# Patient Record
Sex: FEMALE | Race: WHITE | NOT HISPANIC OR LATINO | Employment: FULL TIME | URBAN - METROPOLITAN AREA
[De-identification: names, ages, dates, MRNs, and addresses within clinical notes are randomized per-mention and may not be internally consistent; named-entity substitution may affect disease eponyms.]

---

## 2018-11-07 ENCOUNTER — OFFICE VISIT (OUTPATIENT)
Dept: URGENT CARE | Facility: CLINIC | Age: 48
End: 2018-11-07
Payer: COMMERCIAL

## 2018-11-07 VITALS
HEART RATE: 66 BPM | BODY MASS INDEX: 21.16 KG/M2 | OXYGEN SATURATION: 98 % | SYSTOLIC BLOOD PRESSURE: 92 MMHG | TEMPERATURE: 98 F | DIASTOLIC BLOOD PRESSURE: 69 MMHG | WEIGHT: 127 LBS | HEIGHT: 65 IN | RESPIRATION RATE: 18 BRPM

## 2018-11-07 DIAGNOSIS — I80.9 PHLEBITIS AFTER INFUSION, INITIAL ENCOUNTER: Primary | ICD-10-CM

## 2018-11-07 DIAGNOSIS — T80.1XXA PHLEBITIS AFTER INFUSION, INITIAL ENCOUNTER: Primary | ICD-10-CM

## 2018-11-07 PROCEDURE — 99203 OFFICE O/P NEW LOW 30 MIN: CPT | Mod: S$GLB,,, | Performed by: FAMILY MEDICINE

## 2018-11-07 NOTE — PROGRESS NOTES
"Subjective:       Patient ID: Pat Freed is a 48 y.o. female.    Vitals:  height is 5' 5" (1.651 m) and weight is 57.6 kg (127 lb). Her tympanic temperature is 98.1 °F (36.7 °C). Her blood pressure is 92/69 and her pulse is 66. Her respiration is 18 and oxygen saturation is 98%.     Chief Complaint: Arm Pain    Patient had blood work done by a home health nurse on 2nov2018 and her arm is bruised and hurts.      Arm Pain    The incident occurred more than 1 week ago. The incident occurred at home. Injury mechanism: blood draw at home. The pain is present in the right forearm. The quality of the pain is described as aching and shooting. The pain radiates to the right neck (pain radiates up right arm to shoulder). The pain is moderate. Pertinent negatives include no chest pain. The symptoms are aggravated by movement. She has tried nothing for the symptoms.    patient denies any upper arm swelling. No fever no chills no cough, hemoptysis, or dyspnea.  She is not on any anticoagulants.  Review of Systems   Constitution: Negative for chills and fever.   HENT: Negative for sore throat.    Eyes: Negative for blurred vision.   Cardiovascular: Negative for chest pain.   Respiratory: Negative for shortness of breath.    Skin: Negative for rash.   Musculoskeletal: Positive for joint pain (right arm pain). Negative for back pain.   Gastrointestinal: Negative for abdominal pain, diarrhea, nausea and vomiting.   Neurological: Negative for headaches.   Psychiatric/Behavioral: The patient is not nervous/anxious.        Objective:      Physical Exam   Constitutional: She appears well-developed and well-nourished.   HENT:   Head: Normocephalic and atraumatic.   Mouth/Throat: Oropharynx is clear and moist.   Neck: Normal range of motion. Neck supple.   Cardiovascular: Normal rate and regular rhythm.   Pulmonary/Chest: Effort normal and breath sounds normal.   Musculoskeletal:   Subcutaneous ecchymoses and hematoma noted along " the right brachioradialis muscle just distal to recent phlebotomy needlestick site.  Mildly tender but no secondary infection.  Arterial circulation and capillary refill normal bilaterally.  No swelling or discomfort to palpation proximal to the phlebotomy needlestick site at the antecubital fossa.   Nursing note and vitals reviewed.      Assessment:       1. Phlebitis after infusion, initial encounter        Plan:         Phlebitis after infusion, initial encounter     patient's symptoms in physical findings indicate a subcutaneous hematoma associated with recent phlebotomy.  However I do not see any evidence of upper extremity DVT or secondary infection.  Local care including moist heat elevation muscle size should help to resolve the symptoms.  Patient is advised to be re-evaluated if she develops proximal arm swelling or increasing proximal arm pain.

## 2018-11-07 NOTE — PATIENT INSTRUCTIONS
Superficial Thrombophlebitis   The superficial veins are the veins near the surface of the skin. Superficial thrombophlebitis is a problem that occurs when one or more of these veins become inflamed (red, irritated, and swollen). This is most often because of a blood clot.  Causes  The problem may occur after injury to a vein. It may also occur after having an intravenous (IV) line placed. Other factors that can make the problem more likely include:  · Varicose veins  · Venous insufficiency  · Bleeding disorders  · Prolonged periods of rest and not moving around  · IV drug abuse  Symptoms  Symptoms may appear in the affected area. They can include:  · Pain  · Tenderness  · Redness  · Warmth  · Swelling  · Hardening of the vein  In most cases, superficial thrombophlebitis resolves on its own with no problems. Treatment is focused on relieving symptoms.  Sometimes, there is a risk that the deep veins in the body may also be involved. This can lead to more serious problems. In such cases, further testing and treatments may be needed. Your healthcare provider can tell you more about this.  Home Care  To help relieve pain and swelling, you may be told to:  · Apply heat or cold to the affected area. Do this for up to 10 minutes as often as directed.  ¨ Heat: Use a warm compress, such as a heating pad.  ¨ Cold: Use a cold compress, such as a cold pack or bag of ice wrapped in a thin towel.  · Take nonsteroidal anti-inflammatory drugs (NSAIDS), such as ibuprofen. In some cases, other pain medicines may be prescribed.  · Keep the affected limb (arm or leg) raised above heart level as directed.  · Wear elastic compression stockings or bandages as directed.  · Avoid prolonged sitting or standing. Get up and walk often.  To help treat a blood clot, a blood thinner (anticoagulant) may be prescribed. If this is needed, be sure to take the medicine exactly as directed.  Follow-up care  Follow up with your healthcare provider as  advised. If imaging tests are done, they will be reviewed by a doctor. Youll be told the results and any new findings that may affect your treatment.  When to seek medical advice  Call your healthcare provider right away if any of these occur:  · Fever of 100.4°F (38ºC) or higher, or as directed by your provider  · Increasing pain, swelling, or tenderness in the affected area  · Spreading warmth or redness in the affected area  Call 911  Call 911 right away if any of these occur:  · Trouble breathing  · Chest pain or discomfort that worsens with deep breathing or coughing  · Coughing (may cough up blood)  · Fast or irregular heartbeat  · Sweating  · Anxiety  · Lightheadedness, dizziness, or fainting  · Extreme confusion  · Extreme drowsiness or trouble waking up  · New pain in the chest, arm, shoulder, neck, or upper back  Date Last Reviewed: 9/21/2015  © 8898-5953 [employer]. [address] [place] [address], [place] [address]. All rights reserved. This information is not intended as a substitute for professional medical care. Always follow your healthcare professional's instructions.

## 2018-11-10 ENCOUNTER — TELEPHONE (OUTPATIENT)
Dept: URGENT CARE | Facility: CLINIC | Age: 48
End: 2018-11-10

## 2018-11-10 NOTE — TELEPHONE ENCOUNTER
I attempted to check on the patient since her visit, but there was no answer and no way to leave a message.

## 2018-11-11 ENCOUNTER — OFFICE VISIT (OUTPATIENT)
Dept: URGENT CARE | Facility: CLINIC | Age: 48
End: 2018-11-11
Payer: COMMERCIAL

## 2018-11-11 VITALS
WEIGHT: 127 LBS | RESPIRATION RATE: 15 BRPM | OXYGEN SATURATION: 96 % | HEART RATE: 80 BPM | DIASTOLIC BLOOD PRESSURE: 75 MMHG | TEMPERATURE: 98 F | BODY MASS INDEX: 21.16 KG/M2 | HEIGHT: 65 IN | SYSTOLIC BLOOD PRESSURE: 102 MMHG

## 2018-11-11 DIAGNOSIS — I80.8: Primary | ICD-10-CM

## 2018-11-11 PROCEDURE — 99214 OFFICE O/P EST MOD 30 MIN: CPT | Mod: S$GLB,,, | Performed by: FAMILY MEDICINE

## 2018-11-11 NOTE — PROGRESS NOTES
"Subjective:       Patient ID: Pat Freed is a 48 y.o. female.    Vitals:  height is 5' 5" (1.651 m) and weight is 57.6 kg (127 lb). Her oral temperature is 98.4 °F (36.9 °C). Her blood pressure is 102/75 and her pulse is 80. Her respiration is 15 and oxygen saturation is 96%.     Chief Complaint: Arm Pain    Patient returns today for increased bruising, pain, tingling, swelling in her right arm. She has applied warm compress and elevation but is not getting any relief.      Arm Pain    The incident occurred more than 1 week ago. The incident occurred at home. Injury mechanism: Blood draw by home health nurse. The pain is present in the right shoulder, right hand, right forearm, right fingers, right elbow, right wrist and upper right arm. The quality of the pain is described as aching, burning and shooting. The pain is moderate. The pain has been constant since the incident. Associated symptoms include tingling. Pertinent negatives include no chest pain. The symptoms are aggravated by movement. She has tried heat, elevation and immobilization for the symptoms. The treatment provided no relief.     Review of Systems   Constitution: Negative for chills and fever.   HENT: Negative for sore throat.    Eyes: Negative for blurred vision.   Cardiovascular: Negative for chest pain.   Respiratory: Negative for shortness of breath.    Skin: Negative for rash.   Musculoskeletal: Positive for joint pain. Negative for back pain.   Gastrointestinal: Negative for abdominal pain, diarrhea, nausea and vomiting.   Neurological: Positive for tingling. Negative for headaches.   Psychiatric/Behavioral: The patient is not nervous/anxious.        Objective:      Physical Exam   Constitutional: She is oriented to person, place, and time. She appears well-developed and well-nourished.   HENT:   Head: Normocephalic and atraumatic. Head is without abrasion, without contusion and without laceration.   Nose: Nose normal.   Eyes: " Conjunctivae, EOM and lids are normal.   Neck: Trachea normal, full passive range of motion without pain and phonation normal. Neck supple.   Cardiovascular: Normal rate.   Pulmonary/Chest: Effort normal. No stridor. No respiratory distress.   Musculoskeletal: She exhibits edema and tenderness.   Neurological: She is alert and oriented to person, place, and time.   Skin: Skin is warm, dry and intact. Capillary refill takes less than 2 seconds. Bruising and ecchymosis noted. No abrasion, no burn, no laceration, no lesion and no rash noted. No erythema.   Bruising over brachrad muscle and distal forearm   Psychiatric: She has a normal mood and affect. Her speech is normal and behavior is normal. Judgment and thought content normal. Cognition and memory are normal.   Nursing note and vitals reviewed.      Assessment:       1. Phlebitis of forearm        Plan:         Phlebitis of forearm  -     US Upper Extremity Veins Right; Future; Expected date: 11/11/2018        ER if worsens

## 2018-11-13 ENCOUNTER — HOSPITAL ENCOUNTER (OUTPATIENT)
Dept: RADIOLOGY | Facility: HOSPITAL | Age: 48
Discharge: HOME | End: 2018-11-13
Attending: FAMILY MEDICINE
Payer: COMMERCIAL

## 2018-11-13 ENCOUNTER — TELEPHONE (OUTPATIENT)
Dept: URGENT CARE | Facility: CLINIC | Age: 48
End: 2018-11-13

## 2018-11-13 DIAGNOSIS — I80.8: ICD-10-CM

## 2018-11-13 PROCEDURE — 93971 EXTREMITY STUDY: CPT | Mod: 26,,, | Performed by: RADIOLOGY

## 2018-11-13 PROCEDURE — 93971 EXTREMITY STUDY: CPT | Mod: TC,PO

## 2018-12-31 ENCOUNTER — OFFICE VISIT (OUTPATIENT)
Dept: URGENT CARE | Facility: CLINIC | Age: 48
End: 2018-12-31
Payer: COMMERCIAL

## 2018-12-31 VITALS
OXYGEN SATURATION: 97 % | BODY MASS INDEX: 21.16 KG/M2 | SYSTOLIC BLOOD PRESSURE: 98 MMHG | RESPIRATION RATE: 15 BRPM | WEIGHT: 127 LBS | HEIGHT: 65 IN | TEMPERATURE: 98 F | DIASTOLIC BLOOD PRESSURE: 62 MMHG | HEART RATE: 95 BPM

## 2018-12-31 DIAGNOSIS — J06.9 VIRAL URI WITH COUGH: Primary | ICD-10-CM

## 2018-12-31 DIAGNOSIS — F17.200 SMOKER: ICD-10-CM

## 2018-12-31 PROCEDURE — 96372 THER/PROPH/DIAG INJ SC/IM: CPT | Mod: S$GLB,,, | Performed by: PHYSICIAN ASSISTANT

## 2018-12-31 PROCEDURE — 99214 OFFICE O/P EST MOD 30 MIN: CPT | Mod: 25,S$GLB,, | Performed by: PHYSICIAN ASSISTANT

## 2018-12-31 RX ORDER — PROMETHAZINE HYDROCHLORIDE AND DEXTROMETHORPHAN HYDROBROMIDE 6.25; 15 MG/5ML; MG/5ML
5 SYRUP ORAL NIGHTLY PRN
Qty: 180 ML | Refills: 1 | Status: SHIPPED | OUTPATIENT
Start: 2018-12-31 | End: 2019-01-10

## 2018-12-31 RX ORDER — PREDNISONE 10 MG/1
TABLET ORAL
Qty: 18 TABLET | Refills: 0 | Status: SHIPPED | OUTPATIENT
Start: 2018-12-31 | End: 2021-07-13

## 2018-12-31 RX ORDER — BETAMETHASONE SODIUM PHOSPHATE AND BETAMETHASONE ACETATE 3; 3 MG/ML; MG/ML
6 INJECTION, SUSPENSION INTRA-ARTICULAR; INTRALESIONAL; INTRAMUSCULAR; SOFT TISSUE
Status: COMPLETED | OUTPATIENT
Start: 2018-12-31 | End: 2018-12-31

## 2018-12-31 RX ORDER — BENZONATATE 200 MG/1
200 CAPSULE ORAL 3 TIMES DAILY PRN
Qty: 60 CAPSULE | Refills: 1 | Status: SHIPPED | OUTPATIENT
Start: 2018-12-31 | End: 2019-01-10

## 2018-12-31 RX ADMIN — BETAMETHASONE SODIUM PHOSPHATE AND BETAMETHASONE ACETATE 6 MG: 3; 3 INJECTION, SUSPENSION INTRA-ARTICULAR; INTRALESIONAL; INTRAMUSCULAR; SOFT TISSUE at 03:12

## 2018-12-31 NOTE — PATIENT INSTRUCTIONS
Start prednisone in 2-3 days.     How to Quit Smoking  Smoking is one of the hardest habits to break. About half of all people who have ever smoked have been able to quit. Most people who still smoke want to quit. Here are some of the best ways to stop smoking.    Keep trying  Most smokers make many attempts at quitting before they are successful. Its important not to give up.  Go cold turkey  Most former smokers quit cold turkey (all at once). Trying to cut back gradually doesn't seem to work as well, perhaps because it continues the smoking habit. Also, it is possible to inhale more while smoking fewer cigarettes. This results in the same amount of nicotine in your body.  Get support  Support programs can be a big help, especially for heavy smokers. These groups offer lectures, ways to change behavior, and peer support. Here are some ways to find a support program:  · Free national quitline: 800-QUIT-NOW (874-654-5978).  · Hospital quit-smoking programs.  · American Lung Association: (758.913.8495).  · American Cancer Society (457-054-4438).  Support at home is important too. Nonsmokers can offer praise and encouragement. If the smoker in your life finds it hard to quit, encourage them to keep trying.  Over-the-counter medicines  Nicotine replacement therapy may make quitting easier. Certain aids, such as the nicotine patch, gum, and lozenges, are available without a prescription. It is best to use these under a doctors care, though. The skin patch provides a steady supply of nicotine. Nicotine gum and lozenges give temporary bursts of low levels of nicotine. Both methods reduce the craving for cigarettes. Warning: If you have nausea, vomiting, dizziness, weakness, or a fast heartbeat, stop using these products and see your doctor.  Prescription medicines  After reviewing your smoking patterns and past attempts to quit, your doctor may offer a prescription medicine such as bupropion, varenicline, a nicotine  "inhaler, or nasal spray. Each has advantages and side effects. Your doctor can review these with you.  Health benefits of quitting  The benefits of quitting start right away and keep improving the longer you go without smoking. These benefits occur at any age.  So whether you are 17 or 70, quitting is a good decision. Some of the benefits include:  · 20 minutes: Blood pressure and pulse return to normal.  · 8 hours: Oxygen levels return to normal.  · 2 days: Ability to smell and taste begin to improve as damaged nerves regrow.  · 2 to 3 weeks: Circulation and lung function improve.  · 1 to 9 months: Coughing, congestion, and shortness of breath decrease; tiredness decreases.  · 1 year: Risk of heart attack decreases by half.  · 5 years: Risk of lung cancer decreases by half; risk of stroke becomes the same as a nonsmokers.  For more on how to quit smoking, try these online resources:   · Enlightened Lifestyle.gov  · "Clearing the Air" booklet from the National Cancer [place]: mAPPn.gov/sites/default/files/pdf/clearing-the-air-accessible.pdf  Date Last Reviewed: 3/1/2017  © 3490-0195 [employer]. [address] [place] [address]. All rights reserved. This information is not intended as a substitute for professional medical care. Always follow your healthcare professional's instructions.          Viral Upper Respiratory Illness (Adult)  You have a viral upper respiratory illness (URI), which is another term for the common cold. This illness is contagious during the first few days. It is spread through the air by coughing and sneezing. It may also be spread by direct contact (touching the sick person and then touching your own eyes, nose, or mouth). Frequent handwashing will decrease risk of spread. Most viral illnesses go away within 7 to 10 days with rest and simple home remedies. Sometimes the illness may last for several weeks. Antibiotics will not kill a virus, and they are generally not " prescribed for this condition.    Home care  · If symptoms are severe, rest at home for the first 2 to 3 days. When you resume activity, don't let yourself get too tired.  · Avoid being exposed to cigarette smoke (yours or others).  · You may use acetaminophen or ibuprofen to control pain and fever, unless another medicine was prescribed. (Note: If you have chronic liver or kidney disease, have ever had a stomach ulcer or gastrointestinal bleeding, or are taking blood-thinning medicines, talk with your healthcare provider before using these medicines.) Aspirin should never be given to anyone under 18 years of age who is ill with a viral infection or fever. It may cause severe liver or brain damage.  · Your appetite may be poor, so a light diet is fine. Avoid dehydration by drinking 6 to 8 glasses of fluids per day (water, soft drinks, juices, tea, or soup). Extra fluids will help loosen secretions in the nose and lungs.  · Over-the-counter cold medicines will not shorten the length of time youre sick, but they may be helpful for the following symptoms: cough, sore throat, and nasal and sinus congestion. (Note: Do not use decongestants if you have high blood pressure.)  Follow-up care  Follow up with your healthcare provider, or as advised.  When to seek medical advice  Call your healthcare provider right away if any of these occur:  · Cough with lots of colored sputum (mucus)  · Severe headache; face, neck, or ear pain  · Difficulty swallowing due to throat pain  · Fever of 100.4°F (38°C)  Call 911, or get immediate medical care  Call emergency services right away if any of these occur:  · Chest pain, shortness of breath, wheezing, or difficulty breathing  · Coughing up blood  · Inability to swallow due to throat pain  Date Last Reviewed: 9/13/2015  © 8490-8708 [employer]. [address] [place] [address], [place] [address]. All rights reserved. This information is not intended as a substitute for  professional medical care. Always follow your healthcare professional's instructions.    You received a steroid shot today - this can elevate your blood pressure, elevate your blood sugar, water weight gain, nervous energy, redness to the face and dimpling of the skin where the shot goes in.     If you were prescribed a narcotic medication, do not drive or operate heavy equipment or machinery while taking these medications.  You must understand that you've received an Urgent Care treatment only and that you may be released before all your medical problems are known or treated. You, the patient, will arrange for follow up care as instructed.  Follow up with your PCP or specialty clinic as directed in the next 1-2 weeks if not improved or as needed.  You can call [phone] to schedule an appointment with the appropriate provider.  If your condition worsens we recommend that you receive another evaluation at the emergency room immediately or contact your primary medical clinics after hours call service to discuss your concerns.  Please return here or go to the Emergency Department for any concerns or worsening of condition.

## 2018-12-31 NOTE — PROGRESS NOTES
"Subjective:       Patient ID: Pat Freed is a 48 y.o. female.    Vitals:  height is 5' 5" (1.651 m) and weight is 57.6 kg (127 lb). Her temperature is 97.7 °F (36.5 °C). Her blood pressure is 98/62 and her pulse is 95. Her respiration is 15 and oxygen saturation is 97%.     Chief Complaint: URI (pt has been sick for about a week)    Pt said she has been taking robitussin       URI    This is a new problem. The current episode started in the past 7 days. The problem has been unchanged. There has been no fever. Associated symptoms include congestion, coughing and wheezing. Pertinent negatives include no ear pain, nausea, rash, sinus pain, sore throat or vomiting. She has tried NSAIDs and decongestant for the symptoms. The treatment provided mild relief.       Constitution: Positive for appetite change and fatigue. Negative for chills, sweating and fever.   HENT: Positive for congestion. Negative for ear pain, sinus pain, sinus pressure, sore throat and voice change.    Neck: Negative for painful lymph nodes.   Eyes: Negative for eye redness.   Respiratory: Positive for cough and wheezing. Negative for chest tightness, sputum production, bloody sputum, COPD, shortness of breath, stridor and asthma.    Gastrointestinal: Negative for nausea and vomiting.   Musculoskeletal: Negative for muscle ache.   Skin: Negative for rash.   Allergic/Immunologic: Negative for seasonal allergies and asthma.   Hematologic/Lymphatic: Negative for swollen lymph nodes.       Objective:      Physical Exam   Constitutional: She is oriented to person, place, and time. She appears well-developed and well-nourished. She is cooperative.  Non-toxic appearance. She appears ill. No distress.   HENT:   Head: Normocephalic and atraumatic.   Right Ear: Hearing, tympanic membrane, external ear and ear canal normal.   Left Ear: Hearing, tympanic membrane, external ear and ear canal normal.   Nose: Nose normal. No mucosal edema, rhinorrhea or nasal " deformity. No epistaxis. Right sinus exhibits no maxillary sinus tenderness and no frontal sinus tenderness. Left sinus exhibits no maxillary sinus tenderness and no frontal sinus tenderness.   Mouth/Throat: Uvula is midline, oropharynx is clear and moist and mucous membranes are normal. No trismus in the jaw. Normal dentition. No uvula swelling. No posterior oropharyngeal erythema.   Eyes: Conjunctivae and lids are normal. No scleral icterus.   Sclera clear bilat   Neck: Trachea normal, full passive range of motion without pain and phonation normal. Neck supple.   Cardiovascular: Normal rate, regular rhythm, normal heart sounds, intact distal pulses and normal pulses.   Pulmonary/Chest: Effort normal and breath sounds normal. No respiratory distress.   Abdominal: Soft. Normal appearance and bowel sounds are normal. She exhibits no distension. There is no tenderness.   Musculoskeletal: Normal range of motion. She exhibits no edema or deformity.   Neurological: She is alert and oriented to person, place, and time. She exhibits normal muscle tone. Coordination normal.   Skin: Skin is warm, dry and intact. She is not diaphoretic. No pallor.   Psychiatric: She has a normal mood and affect. Her speech is normal and behavior is normal. Judgment and thought content normal. Cognition and memory are normal.   Nursing note and vitals reviewed.      Assessment:       1. Viral URI with cough        Plan:         Viral URI with cough  -     betamethasone acetate-betamethasone sodium phosphate injection 6 mg  -     benzonatate (TESSALON) 200 MG capsule; Take 1 capsule (200 mg total) by mouth 3 (three) times daily as needed for Cough.  Dispense: 60 capsule; Refill: 1  -     promethazine-dextromethorphan (PROMETHAZINE-DM) 6.25-15 mg/5 mL Syrp; Take 5 mLs by mouth nightly as needed.  Dispense: 180 mL; Refill: 1  -     predniSONE (DELTASONE) 10 MG tablet; Take two pills po x 5 days, 1 pill po x 5 days, 1/2 pill po until empty. Start  24 hours after injection.  Dispense: 18 tablet; Refill: 0      Patient Instructions   Start prednisone in 2-3 days.     How to Quit Smoking  Smoking is one of the hardest habits to break. About half of all people who have ever smoked have been able to quit. Most people who still smoke want to quit. Here are some of the best ways to stop smoking.    Keep trying  Most smokers make many attempts at quitting before they are successful. Its important not to give up.  Go cold turkey  Most former smokers quit cold turkey (all at once). Trying to cut back gradually doesn't seem to work as well, perhaps because it continues the smoking habit. Also, it is possible to inhale more while smoking fewer cigarettes. This results in the same amount of nicotine in your body.  Get support  Support programs can be a big help, especially for heavy smokers. These groups offer lectures, ways to change behavior, and peer support. Here are some ways to find a support program:  · Free national quitline: 800-QUIT-NOW (691-202-0029).  · Hospital quit-smoking programs.  · American Lung Association: (920.722.2904).  · American Cancer Society (034-380-5691).  Support at home is important too. Nonsmokers can offer praise and encouragement. If the smoker in your life finds it hard to quit, encourage them to keep trying.  Over-the-counter medicines  Nicotine replacement therapy may make quitting easier. Certain aids, such as the nicotine patch, gum, and lozenges, are available without a prescription. It is best to use these under a doctors care, though. The skin patch provides a steady supply of nicotine. Nicotine gum and lozenges give temporary bursts of low levels of nicotine. Both methods reduce the craving for cigarettes. Warning: If you have nausea, vomiting, dizziness, weakness, or a fast heartbeat, stop using these products and see your doctor.  Prescription medicines  After reviewing your smoking patterns and past attempts to quit, your  "doctor may offer a prescription medicine such as bupropion, varenicline, a nicotine inhaler, or nasal spray. Each has advantages and side effects. Your doctor can review these with you.  Health benefits of quitting  The benefits of quitting start right away and keep improving the longer you go without smoking. These benefits occur at any age.  So whether you are 17 or 70, quitting is a good decision. Some of the benefits include:  · 20 minutes: Blood pressure and pulse return to normal.  · 8 hours: Oxygen levels return to normal.  · 2 days: Ability to smell and taste begin to improve as damaged nerves regrow.  · 2 to 3 weeks: Circulation and lung function improve.  · 1 to 9 months: Coughing, congestion, and shortness of breath decrease; tiredness decreases.  · 1 year: Risk of heart attack decreases by half.  · 5 years: Risk of lung cancer decreases by half; risk of stroke becomes the same as a nonsmokers.  For more on how to quit smoking, try these online resources:   · [employer].gov  · "Clearing the Air" booklet from the National Cancer [place]: [employer]gov/sites/default/files/pdf/clearing-the-air-accessible.pdf  Date Last Reviewed: 3/1/2017  © 5703-6239 [employer]. [address] [place] [address], [place] [address]. All rights reserved. This information is not intended as a substitute for professional medical care. Always follow your healthcare professional's instructions.          Viral Upper Respiratory Illness (Adult)  You have a viral upper respiratory illness (URI), which is another term for the common cold. This illness is contagious during the first few days. It is spread through the air by coughing and sneezing. It may also be spread by direct contact (touching the sick person and then touching your own eyes, nose, or mouth). Frequent handwashing will decrease risk of spread. Most viral illnesses go away within 7 to 10 days with rest and simple home remedies. Sometimes the illness may last for " several weeks. Antibiotics will not kill a virus, and they are generally not prescribed for this condition.    Home care  · If symptoms are severe, rest at home for the first 2 to 3 days. When you resume activity, don't let yourself get too tired.  · Avoid being exposed to cigarette smoke (yours or others).  · You may use acetaminophen or ibuprofen to control pain and fever, unless another medicine was prescribed. (Note: If you have chronic liver or kidney disease, have ever had a stomach ulcer or gastrointestinal bleeding, or are taking blood-thinning medicines, talk with your healthcare provider before using these medicines.) Aspirin should never be given to anyone under 18 years of age who is ill with a viral infection or fever. It may cause severe liver or brain damage.  · Your appetite may be poor, so a light diet is fine. Avoid dehydration by drinking 6 to 8 glasses of fluids per day (water, soft drinks, juices, tea, or soup). Extra fluids will help loosen secretions in the nose and lungs.  · Over-the-counter cold medicines will not shorten the length of time youre sick, but they may be helpful for the following symptoms: cough, sore throat, and nasal and sinus congestion. (Note: Do not use decongestants if you have high blood pressure.)  Follow-up care  Follow up with your healthcare provider, or as advised.  When to seek medical advice  Call your healthcare provider right away if any of these occur:  · Cough with lots of colored sputum (mucus)  · Severe headache; face, neck, or ear pain  · Difficulty swallowing due to throat pain  · Fever of 100.4°F (38°C)  Call 911, or get immediate medical care  Call emergency services right away if any of these occur:  · Chest pain, shortness of breath, wheezing, or difficulty breathing  · Coughing up blood  · Inability to swallow due to throat pain  Date Last Reviewed: 9/13/2015  © 4392-7316 [employer]. [address] [place] [address], [place] [address]. All  rights reserved. This information is not intended as a substitute for professional medical care. Always follow your healthcare professional's instructions.    You received a steroid shot today - this can elevate your blood pressure, elevate your blood sugar, water weight gain, nervous energy, redness to the face and dimpling of the skin where the shot goes in.     If you were prescribed a narcotic medication, do not drive or operate heavy equipment or machinery while taking these medications.  You must understand that you've received an Urgent Care treatment only and that you may be released before all your medical problems are known or treated. You, the patient, will arrange for follow up care as instructed.  Follow up with your PCP or specialty clinic as directed in the next 1-2 weeks if not improved or as needed.  You can call [phone] to schedule an appointment with the appropriate provider.  If your condition worsens we recommend that you receive another evaluation at the emergency room immediately or contact your primary medical clinics after hours call service to discuss your concerns.  Please return here or go to the Emergency Department for any concerns or worsening of condition.

## 2019-01-03 ENCOUNTER — TELEPHONE (OUTPATIENT)
Dept: URGENT CARE | Facility: CLINIC | Age: 49
End: 2019-01-03

## 2019-04-15 ENCOUNTER — OFFICE VISIT (OUTPATIENT)
Dept: URGENT CARE | Facility: CLINIC | Age: 49
End: 2019-04-15
Payer: COMMERCIAL

## 2019-04-15 VITALS
WEIGHT: 127 LBS | HEIGHT: 65 IN | HEART RATE: 80 BPM | TEMPERATURE: 98 F | BODY MASS INDEX: 21.16 KG/M2 | OXYGEN SATURATION: 98 %

## 2019-04-15 DIAGNOSIS — J01.00 ACUTE MAXILLARY SINUSITIS, RECURRENCE NOT SPECIFIED: ICD-10-CM

## 2019-04-15 DIAGNOSIS — R42 VERTIGO: Primary | ICD-10-CM

## 2019-04-15 PROCEDURE — 99213 PR OFFICE/OUTPT VISIT, EST, LEVL III, 20-29 MIN: ICD-10-PCS | Mod: S$GLB,,, | Performed by: FAMILY MEDICINE

## 2019-04-15 PROCEDURE — 99213 OFFICE O/P EST LOW 20 MIN: CPT | Mod: S$GLB,,, | Performed by: FAMILY MEDICINE

## 2019-04-15 RX ORDER — AMOXICILLIN 875 MG/1
875 TABLET, FILM COATED ORAL 2 TIMES DAILY
Qty: 20 TABLET | Refills: 0 | Status: SHIPPED | OUTPATIENT
Start: 2019-04-15 | End: 2019-04-25

## 2019-04-15 NOTE — PATIENT INSTRUCTIONS
Acute Sinusitis    Acute sinusitis is irritation and swelling of the sinuses. It is usually caused by a viral infection after a common cold. Your doctor can help you find relief.  What is acute sinusitis?  Sinuses are air-filled spaces in the skull behind the face. They are kept moist and clean by a lining of mucosa. Things such as pollen, smoke, and chemical fumes can irritate the mucosa. It can then swell up. As a response to irritation, the mucosa makes more mucus and other fluids. Tiny hairlike cilia cover the mucosa. Cilia help carry mucus toward the opening of the sinus. Too much mucus may cause the cilia to stop working. This blocks the sinus opening. A buildup of fluid in the sinuses then causes pain and pressure. It can also encourage bacteria to grow in the sinuses.  Common symptoms of acute sinusitis  You may have:  · Facial soreness pain  · Headache  · Fever  · Fluid draining in the back of the throat (postnasal drip)  · Congestion  · Drainage that is thick and colored, instead of clear  · Cough  Diagnosing acute sinusitis  Your doctor will ask about your symptoms and health history. He or she will look at your ear, nose, and throat. You usually won't need to have X-rays taken.    The doctor may take a sample of mucus to check for bacteria. If you have sinusitis that keeps coming back, you may need imaging tests such as X-rays or CAT scans. This will help your doctor check for a structural problem that may be causing the infection.  Treating acute sinusitis  Treatment is aimed at unblocking the sinus opening and helping the cilia work again. You may need to take antihistamine and decongestant medicine. These can reduce inflammation and decrease the amount of fluid your sinuses make. If you have a bacterial infection, you will need to take antibiotic medicine for 10 to 14 days. Take this medicine until it is gone, even if you feel better.  Date Last Reviewed: 10/1/2016  © 2682-0719 [employer] [address] [place] [address]. All rights reserved. This information is not intended as a substitute for professional medical care. Always follow your healthcare professional's instructions.

## 2019-04-15 NOTE — PROGRESS NOTES
"Subjective:       Patient ID: Pat Freed is a 48 y.o. female.    Vitals:  height is 5' 5" (1.651 m) and weight is 57.6 kg (127 lb). Her oral temperature is 97.6 °F (36.4 °C). Her pulse is 80. Her oxygen saturation is 98%.     Chief Complaint: Dizziness    Pt c/o cough, light-headed, dizziness, congestion, and headache, x     Dizziness:   Chronicity:  New  Onset:  Yesterday  Progression since onset:  Unchanged  Pain Scale:  1/10  Severity:  Mild   Associated symptoms: headaches.no ear pain, no fever, no nausea, no vomiting and no diaphoresis.  Aggravated by:  Getting up, bending and position changes  Treatments tried: flonase.  URI    This is a new problem. The current episode started yesterday. The problem has been gradually worsening. There has been no fever. Associated symptoms include congestion, coughing, headaches and rhinorrhea. Pertinent negatives include no ear pain, nausea, rash, sinus pain, sore throat, vomiting or wheezing. Treatments tried: flonase        Constitution: Negative for chills, sweating, fatigue and fever.   HENT: Positive for congestion and sinus pressure. Negative for ear pain, sinus pain, sore throat and voice change.    Neck: Negative for painful lymph nodes.   Eyes: Negative for eye redness.   Respiratory: Positive for cough. Negative for chest tightness, sputum production, bloody sputum, COPD, shortness of breath, stridor, wheezing and asthma.    Gastrointestinal: Negative for nausea and vomiting.   Musculoskeletal: Negative for muscle ache.   Skin: Negative for rash.   Allergic/Immunologic: Negative for seasonal allergies and asthma.   Neurological: Positive for dizziness, loss of balance, headaches and disorientation.   Hematologic/Lymphatic: Negative for swollen lymph nodes.   Psychiatric/Behavioral: Positive for disorientation.       Objective:      Physical Exam   Constitutional: She is oriented to person, place, and time. She appears well-developed and well-nourished. She " is cooperative.  Non-toxic appearance. She does not appear ill. No distress.   HENT:   Head: Normocephalic and atraumatic.   Right Ear: Hearing, tympanic membrane, external ear and ear canal normal.   Left Ear: Hearing, tympanic membrane, external ear and ear canal normal.   Nose: Nose normal. No mucosal edema, rhinorrhea or nasal deformity. No epistaxis. Right sinus exhibits no maxillary sinus tenderness and no frontal sinus tenderness. Left sinus exhibits no maxillary sinus tenderness and no frontal sinus tenderness.   Mouth/Throat: Uvula is midline, oropharynx is clear and moist and mucous membranes are normal. No trismus in the jaw. Normal dentition. No uvula swelling. No posterior oropharyngeal erythema.   Eyes: Conjunctivae and lids are normal. No scleral icterus.   Sclera clear bilat   Neck: Trachea normal, full passive range of motion without pain and phonation normal. Neck supple.   Cardiovascular: Normal rate, regular rhythm, normal heart sounds, intact distal pulses and normal pulses.   Pulmonary/Chest: Effort normal and breath sounds normal. No respiratory distress.   Abdominal: Soft. Normal appearance and bowel sounds are normal. She exhibits no distension. There is no tenderness.   Musculoskeletal: Normal range of motion. She exhibits no edema or deformity.   Neurological: She is alert and oriented to person, place, and time. No cranial nerve deficit or sensory deficit. She exhibits normal muscle tone.   Normal cerebellar functions   Skin: Skin is warm, dry and intact. She is not diaphoretic. No pallor.   Psychiatric: She has a normal mood and affect. Her speech is normal and behavior is normal. Judgment and thought content normal. Cognition and memory are normal.   Nursing note and vitals reviewed.      Assessment:       1. Vertigo    2. Acute maxillary sinusitis, recurrence not specified        Plan:         Vertigo    Acute maxillary sinusitis, recurrence not specified    Other orders  -      amoxicillin (AMOXIL) 875 MG tablet; Take 1 tablet (875 mg total) by mouth 2 (two) times daily. for 10 days  Dispense: 20 tablet; Refill: 0

## 2021-04-17 ENCOUNTER — OFFICE VISIT (OUTPATIENT)
Dept: URGENT CARE | Facility: CLINIC | Age: 51
End: 2021-04-17
Payer: COMMERCIAL

## 2021-04-17 VITALS
HEART RATE: 93 BPM | HEIGHT: 65 IN | TEMPERATURE: 98 F | OXYGEN SATURATION: 97 % | WEIGHT: 125 LBS | DIASTOLIC BLOOD PRESSURE: 82 MMHG | BODY MASS INDEX: 20.83 KG/M2 | SYSTOLIC BLOOD PRESSURE: 128 MMHG

## 2021-04-17 DIAGNOSIS — Z76.89 ENCOUNTER TO ESTABLISH CARE WITH NEW DOCTOR: ICD-10-CM

## 2021-04-17 DIAGNOSIS — M54.50 ACUTE LOW BACK PAIN WITHOUT SCIATICA, UNSPECIFIED BACK PAIN LATERALITY: Primary | ICD-10-CM

## 2021-04-17 DIAGNOSIS — M54.9 DORSALGIA, UNSPECIFIED: ICD-10-CM

## 2021-04-17 PROCEDURE — 3008F BODY MASS INDEX DOCD: CPT | Mod: CPTII,S$GLB,, | Performed by: NURSE PRACTITIONER

## 2021-04-17 PROCEDURE — 96372 THER/PROPH/DIAG INJ SC/IM: CPT | Mod: S$GLB,,, | Performed by: FAMILY MEDICINE

## 2021-04-17 PROCEDURE — 72100 XR LUMBAR SPINE AP AND LATERAL: ICD-10-PCS | Mod: S$GLB,,, | Performed by: RADIOLOGY

## 2021-04-17 PROCEDURE — 3008F PR BODY MASS INDEX (BMI) DOCUMENTED: ICD-10-PCS | Mod: CPTII,S$GLB,, | Performed by: NURSE PRACTITIONER

## 2021-04-17 PROCEDURE — 99214 OFFICE O/P EST MOD 30 MIN: CPT | Mod: 25,S$GLB,, | Performed by: NURSE PRACTITIONER

## 2021-04-17 PROCEDURE — 99214 PR OFFICE/OUTPT VISIT, EST, LEVL IV, 30-39 MIN: ICD-10-PCS | Mod: 25,S$GLB,, | Performed by: NURSE PRACTITIONER

## 2021-04-17 PROCEDURE — 72100 X-RAY EXAM L-S SPINE 2/3 VWS: CPT | Mod: S$GLB,,, | Performed by: RADIOLOGY

## 2021-04-17 PROCEDURE — 96372 PR INJECTION,THERAP/PROPH/DIAG2ST, IM OR SUBCUT: ICD-10-PCS | Mod: S$GLB,,, | Performed by: FAMILY MEDICINE

## 2021-04-17 RX ORDER — METHOCARBAMOL 500 MG/1
500 TABLET, FILM COATED ORAL 4 TIMES DAILY
Qty: 30 TABLET | Refills: 0 | Status: SHIPPED | OUTPATIENT
Start: 2021-04-17 | End: 2021-04-27

## 2021-04-17 RX ORDER — KETOROLAC TROMETHAMINE 30 MG/ML
30 INJECTION, SOLUTION INTRAMUSCULAR; INTRAVENOUS
Status: COMPLETED | OUTPATIENT
Start: 2021-04-17 | End: 2021-04-17

## 2021-04-17 RX ORDER — NAPROXEN 500 MG/1
500 TABLET ORAL 2 TIMES DAILY WITH MEALS
Qty: 20 TABLET | Refills: 0 | Status: SHIPPED | OUTPATIENT
Start: 2021-04-17 | End: 2021-04-27

## 2021-04-17 RX ADMIN — KETOROLAC TROMETHAMINE 30 MG: 30 INJECTION, SOLUTION INTRAMUSCULAR; INTRAVENOUS at 01:04

## 2021-05-06 ENCOUNTER — PATIENT MESSAGE (OUTPATIENT)
Dept: RESEARCH | Facility: HOSPITAL | Age: 51
End: 2021-05-06

## 2021-08-25 ENCOUNTER — OFFICE VISIT (OUTPATIENT)
Dept: OBSTETRICS AND GYNECOLOGY | Facility: CLINIC | Age: 51
End: 2021-08-25
Payer: COMMERCIAL

## 2021-08-25 VITALS
BODY MASS INDEX: 18.44 KG/M2 | WEIGHT: 110.69 LBS | DIASTOLIC BLOOD PRESSURE: 60 MMHG | HEIGHT: 65 IN | SYSTOLIC BLOOD PRESSURE: 104 MMHG

## 2021-08-25 DIAGNOSIS — Z85.42 HISTORY OF UTERINE CANCER: ICD-10-CM

## 2021-08-25 DIAGNOSIS — C53.0 MALIGNANT NEOPLASM OF ENDOCERVIX: ICD-10-CM

## 2021-08-25 DIAGNOSIS — Z12.4 PAP SMEAR FOR CERVICAL CANCER SCREENING: Primary | ICD-10-CM

## 2021-08-25 PROCEDURE — 99999 PR PBB SHADOW E&M-EST. PATIENT-LVL III: ICD-10-PCS | Mod: PBBFAC,,, | Performed by: SPECIALIST

## 2021-08-25 PROCEDURE — 99203 PR OFFICE/OUTPT VISIT, NEW, LEVL III, 30-44 MIN: ICD-10-PCS | Mod: S$GLB,,, | Performed by: SPECIALIST

## 2021-08-25 PROCEDURE — 99203 OFFICE O/P NEW LOW 30 MIN: CPT | Mod: S$GLB,,, | Performed by: SPECIALIST

## 2021-08-25 PROCEDURE — 99999 PR PBB SHADOW E&M-EST. PATIENT-LVL III: CPT | Mod: PBBFAC,,, | Performed by: SPECIALIST

## 2021-08-25 PROCEDURE — 88175 CYTOPATH C/V AUTO FLUID REDO: CPT | Performed by: SPECIALIST

## 2021-10-05 ENCOUNTER — HOSPITAL ENCOUNTER (OUTPATIENT)
Dept: RADIOLOGY | Facility: HOSPITAL | Age: 51
Discharge: HOME | End: 2021-10-05
Attending: SPECIALIST
Payer: COMMERCIAL

## 2021-10-05 DIAGNOSIS — C53.0 MALIGNANT NEOPLASM OF ENDOCERVIX: ICD-10-CM

## 2021-10-05 PROCEDURE — G1004 CT PELVIS WITH AND WITHOUT CONTRAST: ICD-10-PCS | Mod: ,,, | Performed by: RADIOLOGY

## 2021-10-05 PROCEDURE — 72194 CT PELVIS W/O & W/DYE: CPT | Mod: 26,MG,, | Performed by: RADIOLOGY

## 2021-10-05 PROCEDURE — G1004 CDSM NDSC: HCPCS | Mod: ,,, | Performed by: RADIOLOGY

## 2021-10-05 PROCEDURE — 25500020 PHARM REV CODE 255: Mod: PO | Performed by: SPECIALIST

## 2021-10-05 PROCEDURE — G1004 CDSM NDSC: HCPCS

## 2021-10-05 PROCEDURE — A9698 NON-RAD CONTRAST MATERIALNOC: HCPCS | Mod: PO | Performed by: SPECIALIST

## 2021-10-05 PROCEDURE — 72194 CT PELVIS WITH AND WITHOUT CONTRAST: ICD-10-PCS | Mod: 26,MG,, | Performed by: RADIOLOGY

## 2021-10-05 RX ADMIN — IOHEXOL 1000 ML: 9 SOLUTION ORAL at 11:10

## 2021-10-05 RX ADMIN — IOHEXOL 75 ML: 350 INJECTION, SOLUTION INTRAVENOUS at 11:10

## 2022-05-20 PROBLEM — R20.2 NUMBNESS AND TINGLING OF RIGHT ARM: Status: ACTIVE | Noted: 2022-05-20

## 2022-05-20 PROBLEM — R20.0 NUMBNESS AND TINGLING OF RIGHT ARM: Status: ACTIVE | Noted: 2022-05-20

## 2022-05-20 PROBLEM — M54.2 NECK PAIN ON RIGHT SIDE: Status: ACTIVE | Noted: 2022-05-20

## 2022-07-05 ENCOUNTER — OFFICE VISIT (OUTPATIENT)
Dept: NEUROLOGY | Facility: CLINIC | Age: 52
End: 2022-07-05
Payer: COMMERCIAL

## 2022-07-05 VITALS
BODY MASS INDEX: 19.41 KG/M2 | RESPIRATION RATE: 17 BRPM | SYSTOLIC BLOOD PRESSURE: 113 MMHG | HEIGHT: 65 IN | WEIGHT: 116.5 LBS | DIASTOLIC BLOOD PRESSURE: 78 MMHG | HEART RATE: 80 BPM

## 2022-07-05 DIAGNOSIS — F41.9 ANXIETY: ICD-10-CM

## 2022-07-05 DIAGNOSIS — G89.29 CHRONIC RIGHT SHOULDER PAIN: ICD-10-CM

## 2022-07-05 DIAGNOSIS — M25.511 CHRONIC RIGHT SHOULDER PAIN: ICD-10-CM

## 2022-07-05 DIAGNOSIS — G43.019 INTRACTABLE MIGRAINE WITHOUT AURA AND WITHOUT STATUS MIGRAINOSUS: ICD-10-CM

## 2022-07-05 DIAGNOSIS — R20.0 NUMBNESS AND TINGLING OF RIGHT ARM: ICD-10-CM

## 2022-07-05 DIAGNOSIS — R20.2 NUMBNESS AND TINGLING OF RIGHT ARM: ICD-10-CM

## 2022-07-05 DIAGNOSIS — M79.18 CERVICAL MYOFASCIAL PAIN SYNDROME: ICD-10-CM

## 2022-07-05 DIAGNOSIS — G43.109 MIGRAINE WITH AURA AND WITHOUT STATUS MIGRAINOSUS, NOT INTRACTABLE: Primary | ICD-10-CM

## 2022-07-05 PROCEDURE — 3078F PR MOST RECENT DIASTOLIC BLOOD PRESSURE < 80 MM HG: ICD-10-PCS | Mod: CPTII,S$GLB,, | Performed by: PSYCHIATRY & NEUROLOGY

## 2022-07-05 PROCEDURE — 1159F MED LIST DOCD IN RCRD: CPT | Mod: CPTII,S$GLB,, | Performed by: PSYCHIATRY & NEUROLOGY

## 2022-07-05 PROCEDURE — 1160F RVW MEDS BY RX/DR IN RCRD: CPT | Mod: CPTII,S$GLB,, | Performed by: PSYCHIATRY & NEUROLOGY

## 2022-07-05 PROCEDURE — 3008F PR BODY MASS INDEX (BMI) DOCUMENTED: ICD-10-PCS | Mod: CPTII,S$GLB,, | Performed by: PSYCHIATRY & NEUROLOGY

## 2022-07-05 PROCEDURE — 3074F PR MOST RECENT SYSTOLIC BLOOD PRESSURE < 130 MM HG: ICD-10-PCS | Mod: CPTII,S$GLB,, | Performed by: PSYCHIATRY & NEUROLOGY

## 2022-07-05 PROCEDURE — 99205 PR OFFICE/OUTPT VISIT, NEW, LEVL V, 60-74 MIN: ICD-10-PCS | Mod: S$GLB,,, | Performed by: PSYCHIATRY & NEUROLOGY

## 2022-07-05 PROCEDURE — 99999 PR PBB SHADOW E&M-EST. PATIENT-LVL V: CPT | Mod: PBBFAC,,, | Performed by: PSYCHIATRY & NEUROLOGY

## 2022-07-05 PROCEDURE — 1160F PR REVIEW ALL MEDS BY PRESCRIBER/CLIN PHARMACIST DOCUMENTED: ICD-10-PCS | Mod: CPTII,S$GLB,, | Performed by: PSYCHIATRY & NEUROLOGY

## 2022-07-05 PROCEDURE — 1159F PR MEDICATION LIST DOCUMENTED IN MEDICAL RECORD: ICD-10-PCS | Mod: CPTII,S$GLB,, | Performed by: PSYCHIATRY & NEUROLOGY

## 2022-07-05 PROCEDURE — 3008F BODY MASS INDEX DOCD: CPT | Mod: CPTII,S$GLB,, | Performed by: PSYCHIATRY & NEUROLOGY

## 2022-07-05 PROCEDURE — 3074F SYST BP LT 130 MM HG: CPT | Mod: CPTII,S$GLB,, | Performed by: PSYCHIATRY & NEUROLOGY

## 2022-07-05 PROCEDURE — 99205 OFFICE O/P NEW HI 60 MIN: CPT | Mod: S$GLB,,, | Performed by: PSYCHIATRY & NEUROLOGY

## 2022-07-05 PROCEDURE — 3078F DIAST BP <80 MM HG: CPT | Mod: CPTII,S$GLB,, | Performed by: PSYCHIATRY & NEUROLOGY

## 2022-07-05 PROCEDURE — 99999 PR PBB SHADOW E&M-EST. PATIENT-LVL V: ICD-10-PCS | Mod: PBBFAC,,, | Performed by: PSYCHIATRY & NEUROLOGY

## 2022-07-05 RX ORDER — ESCITALOPRAM OXALATE 20 MG/1
20 TABLET ORAL DAILY
COMMUNITY
Start: 2022-06-12 | End: 2022-07-08 | Stop reason: SDUPTHER

## 2022-07-05 RX ORDER — LAMOTRIGINE 25 MG/1
TABLET ORAL
Qty: 120 TABLET | Refills: 6 | Status: SHIPPED | OUTPATIENT
Start: 2022-07-05 | End: 2022-09-23 | Stop reason: SDUPTHER

## 2022-07-05 NOTE — PATIENT INSTRUCTIONS
1- For preventive management: A. Start magnesium 250mg to 500mg daily and Vitamin b2 200mg twice a day --> over the counter  They sell branded supplements for migraine:   A. Migrelief (magnesium, b2, feverfew)   B. Dolovent (mag, b2, coq10)            B. Lamictal taper:   Start Lamictal as follows: 25mg at bedtime for 1 week, then increase to 50mg at bedtime  for 1 week, then increase to 25mg in the morning and 50mg at bedtime for 1 week, then increase to 50mg twice a day and continue at this dose.     If you get a rash with the lamotrigine please discontinue medication, and let us know.       2- Acute management: sumatriptan 50mg at onset of attack, can repeat after 2 hours. Max 2/day.       3- MRI brain w/o contrast for the new onset aura

## 2022-07-05 NOTE — PROGRESS NOTES
[facility]    Chief complaint: migraine   Referred by: Michelle Canas MD  [address] [facility] [place] [facility]  [place] [address]     History of Present Illness:    51Y RH F with migraine, COPD,  uterine cancer, anxiety, chronic right shoulder pain who presents for initial evaluation of headache. She also has chronic shoulder pain and pain into the right arm. She mentions that has seen Dr. Wilde in Orthopaedics and was sent for EMG with Dr. Dale. She is a  with her .     Headache history  Age at onset and course over time:  Since childhood they were infrequent and then over the years they have been about the same. She was having them 2-3 times a week or so. She no longer has 4 days attacks since she was younger. She mentions that now headache are shorter, but they are more frequent. Moved to [place] after [place]. She still gets 2 day migraine here and there, but not always. She would treat with sleep, rest.  She mentions that she had a severe migraine in 2014 while in [place] and she had to go to ER to be evaluated and treated and was told has caffeine withdrawal headache so she was told to continue taking it.      About 6 months to 1 year she had her first onset of visual aura described as a kaleidoscope which will last from 5 minutes to 60 minutes, suddenly will be a small kaleidoscope and and it will grow in size over the course. She mentions that she has bilateral eye symptoms. She mentions that even when closes her eyes she will have the visual symptoms. She has a visual aura with every headache 1-2/week. The headache will quickly start after that. She mentions that since taking sumatriptan 50mg she will treat immediately will stop the kaleidoscope of colors and avoid the headache.     She will also have headache that will start and move around the head which were happening quite often before, but in the past 3 weeks to 1 month has resolved.   Location:  "holocephalic   Character: throbbing/pounding, pressure, stabbing, sharp   Intensity:  1-10/10   Frequency: "kaleidoscope eye" visual symptoms -- scintillating scotoma   Duration: >4 hours up to days   Aura: none   Associated symptoms: photophobia, phonophobia,  kinesiophobia, neck tightness/pain, loss of appetite, nausea  Other neurologic symptoms: dizziness, mood changes, difficulty concentrating and doing tasks/problems with relaxation  Precipitating factors: fatigue,  weather changes, stress  Alleviating factors: massage, local pressure, medications  Aggravating factors: exposure to light, sound,when already has one bending over,  Stress  No TVOs  Has chronic tinnitus b/l   No positional component   No thunderclap   Family history of headache: sister, mother, MGM, MGGM  ER/UC visits: none   Caffeine: 2-4 cups coffee/day, while driving 1 mug   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   Meloxicam 15mg- given on 4/22 for shoulder pain     Preventive:  Lexapro 20 mg     MIDAS: 0    Neuroimaging and other studies:   No neuroimaging since the scintillating scotomas.     ROS: The fourteen point review of system was performed.   Constitutional:  Denies fevers/cold or heat intolerance, weight loss/gain or fatigue.  Eyes:                        Denies diplopia, ptosis, visual field defects or ocular disease   excepting any listed above.  ENT:   +ringing in ears chronic   Cardiovascular:  Denies stroke, CAD, arrhythmia, CHF or other disease excepting any listed above.  Pulmonary:  Denies dyspnea, COPD, RAD or infection or neoplasm excepting any listed above.  Gastrointestinal: Denies ulcer disease, liver or other disease excepting any listed above.  Skin:   Denies rash, skin cancer, or other cutaneous disorder excepting any listed above.  Neurological:  See " HPI  Musculoskeletal: +chronic neck pain, right shoulder pain, paresthesias in RUE - getting workup currently by ortho   Heme/Lymphatic: Denies any blood or lymph system neoplasm or disorder excepting any listed above.  Endocrine:  Denies any thyroid or other disorders, excepting any listed above.  Allergic/Immuno: Denies any autoimmune disease or allergy excepting any listed above.  Psychiatric:  Denies any disorder or treatment excepting any listed above.  Urologic:  Denies any difficulties with the urinary system or sexual function except noted above.    Past Medical History:   Diagnosis Date    Headache     Pneumothorax     Uterus cancer      Past Surgical History:   Procedure Laterality Date    CHEST TUBE INSERTION Left     HYSTERECTOMY       Family History   Problem Relation Age of Onset    Thyroid disease Mother     Cancer Father         Colon    Heart disease Father     Heart disease Paternal Grandmother     Skin cancer Paternal Grandmother     Heart disease Paternal Grandfather     Heart attack Paternal Uncle     Breast cancer Maternal Aunt     Ovarian cancer Neg Hx      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     Social History     Tobacco Use    Smoking status: Current Some Day Smoker     Packs/day: 1.00     Types: Cigarettes    Smokeless tobacco: Never Used    Tobacco comment: Decreased smoking   Substance Use Topics    Alcohol use: Yes     Comment: social     Drug use: Never     Review of patient's allergies indicates:  No Known Allergies       Current Outpatient Medications:     EScitalopram oxalate (LEXAPRO) 10 MG tablet, Take 10 mg by mouth once daily., Disp: , Rfl:     EScitalopram oxalate (LEXAPRO) 20 MG tablet, Take 20 mg by mouth once daily., Disp: , Rfl:     fluticasone-umeclidin-vilanter (TRELEGY ELLIPTA) 100-62.5-25 mcg DsDv, Inhale 1 puff into the lungs once daily., Disp: 90 each, Rfl: 3    hydrOXYzine pamoate (VISTARIL) 25 MG Cap, Take 1 capsule (25 mg  total) by mouth 3 (three) times daily as needed (anxiety)., Disp: 180 capsule, Rfl: 1    meloxicam (MOBIC) 15 MG tablet, Take 1 tablet (15 mg total) by mouth once daily., Disp: 30 tablet, Rfl: 2    sumatriptan (IMITREX) 50 MG tablet, Take 1 tablet (50 mg total) by mouth every 2 (two) hours as needed for Migraine (no more than two doses in a day)., Disp: 8 tablet, Rfl: 1      PHYSICAL EXAMINATION  Vitals:    07/05/22 0906   BP: 113/78   Pulse: 80   Resp: 17   General: The patient is a well-developed, well-nourished looking of stated age in no acute distress.  Head: Normocephalic, atraumatic  Eyes, ears, nose and throat: normal.  Neck: Supple  Cardiovascular:  No carotid bruits.  Regular rate and rhythm.   Limited ROM with neck lateral rotation to the right >Left   ttp over b/l cervical paraspinals, occipitalis, Rt >Left trapezius   Keeps the Rt shoulder slightly lower in position  No winging scapula   Limited abduction of the Rt shoulder   NEUROLOGIC EXAM:  MENTAL: The patient is awake, alert and oriented times to time, place, location and situation. Intact recent memory, attention, concentration. Language and speech are normal. No aphasia, no dysarthria  CRANIAL NERVES:   CN II: visual fields are intact to confrontation with no defects;  funduscopic examination is within normal limits without evidence of papilledema and signs of venous pulsations.  Pupils are equal, round and reactive to light and accommodation.    III, IV and VI: extraocular movements are intact to all directions of gaze with normal convergence.  V: facial sensation is intact to light touch in divisions V1 through V3.    VII: Facial muscle strength is intact to eyebrow raising, forceful eyelid closure, and cheek puffing.    VIII: Hearing acuity is intact to finger rub.  IX, X: palate rises symmetrically and uvula midline.    XI: Sternocleidomastoid and trapezius strength are 5/5 bilaterally.    XII: Tongue protrudes midline without atrophy or  fasciculations.   MOTOR EXAM: No pronator drift,  shows normal strength ( 5/5 strength )in all 4 extremities. Tone is normal.   SENSORY EXAM: intact light touch bilaterally.  CEREBELLAR EXAM: shows normal finger-to-nose and heel-to-shin.   DEEP TENDON REFLEXES:  +2 in brachioradialis, biceps, triceps, knee jerks, ankle jerks, downgoing toes b/l. No abnormal reflexes are present. No arzate's b/l, no ankle clonus  GAIT/STANCE: Romberg Negative.  Standard gait was normal with normal stride, arm swing and turning.  Normal heel and toe walking and tandem gait.       Impression:  Migraine with aura - visual - has for now 6 months to 1 year a scintillating scotoma that lasts from 5 minutes to 60minutes, typically shorter about 5-10 minutes and then quickly the migraine headche will begin. She is having about 1-2 per week or so. She is unclear of any specific triggers for it. She has been driving [employer], discussed that when that starts no driving can occur until event resolves. Our goal will be to reduce these attacks so that they are not happening. Discussed that is she notices triggers especially if fatigue or poor sleep trigger to not drive on those days. Migraine with aura is unpredictable. Will image her head to ensure no underlying focus to the visual auras. She is in agreement. Discussed when to take imitrex.     Migraine without aura- long standing history of migraine without aura which was a few times a week or more at times daily since childhood, strong family history in maternal side. She felt migraine could be sinus vs.f rom the neck, but the headache would be a migrainous headache. This has slowly improved since the onset in the last 6 months to 1 year of migraine with aura     Cervical myofascial pain/Rt shoulder pain chronic - burnig pain in the C4-C5 distribution in RUE, she had also having RUE paresthesias/burning int he whole arm mainly, but main pain is the elbow to shoulder and shooting pain on b/l sides  anterior/posterior of the 3rd to 5th digits. Awaiting EMG from Dr. Dale and following with orthopedics Dr. Wilde. She had some injury as well with a peripheral blood draw where she says she had hematoma all in her arm and had some paresthesias from that as well. DDx: cervical radiculopathy, cervical myofascial pain, cervical radic + shoulder pathology. Consider neuroimaging with C spine MRI. Agree with EMG.     Comorbidities:  Anxiety - on lexapro 20mg well controlled  COPD- on inhalers, +smoking     Plan:   1- For preventive management: A. Start magnesium 250mg to 500mg daily and Vitamin b2 200mg twice a day           B. Lamictal taper:   Start Lamictal as follows: 25mg at bedtime for 1 week, then increase to 50mg at bedtime   for 1 week, then increase to 25mg in the morning and 50mg at bedtime for 1 week, then increase to 50mg twice a day and continue at this dose.     Side effects and adverse reactions to Lamictal (Lamotrigine) were discussed with patient including: depression exacerbation, low blood count, as well as severe rash which could be life threatening. Patient will contact clinic in case of side effects and if rash stop medication and go seek medical attention.      2- Acute management: sumatriptan 50mg at onset of attack, can repeat after 2 hours. Max 2/day.       3- MRI brain w/o contrast for the new onset aura     RTC in 2 MONTHS.         Allyssa Davis MD   Board Certified Neurologist  [facility] Certified Headache Medicine

## 2022-07-06 ENCOUNTER — TELEPHONE (OUTPATIENT)
Dept: NEUROLOGY | Facility: CLINIC | Age: 52
End: 2022-07-06
Payer: COMMERCIAL

## 2022-07-06 ENCOUNTER — PATIENT MESSAGE (OUTPATIENT)
Dept: NEUROLOGY | Facility: CLINIC | Age: 52
End: 2022-07-06
Payer: COMMERCIAL

## 2022-07-06 ENCOUNTER — HOSPITAL ENCOUNTER (OUTPATIENT)
Dept: RADIOLOGY | Facility: HOSPITAL | Age: 52
Discharge: HOME | End: 2022-07-06
Attending: PSYCHIATRY & NEUROLOGY
Payer: COMMERCIAL

## 2022-07-06 DIAGNOSIS — G43.119 INTRACTABLE MIGRAINE WITH AURA WITHOUT STATUS MIGRAINOSUS: Primary | ICD-10-CM

## 2022-07-06 DIAGNOSIS — G43.109 MIGRAINE WITH AURA AND WITHOUT STATUS MIGRAINOSUS, NOT INTRACTABLE: ICD-10-CM

## 2022-07-06 DIAGNOSIS — R90.89 MRI OF BRAIN ABNORMAL: ICD-10-CM

## 2022-07-06 PROCEDURE — 70551 MRI BRAIN STEM W/O DYE: CPT | Mod: TC,PO

## 2022-07-06 PROCEDURE — 70551 MRI BRAIN WITHOUT CONTRAST: ICD-10-PCS | Mod: 26,,, | Performed by: RADIOLOGY

## 2022-07-06 PROCEDURE — 70551 MRI BRAIN STEM W/O DYE: CPT | Mod: 26,,, | Performed by: RADIOLOGY

## 2022-07-06 NOTE — TELEPHONE ENCOUNTER
----- Message from Allyssa Epperson MD sent at 7/6/2022 11:12 AM CDT -----  Hey the MRI is ok, but has a little spot not sure what it is if an old injury or vascular event from long ago. Need vessel imaging and also TTE with bubble study to ensure I am not missing another entity to the migraine aura starting.    Dr. WHEELER

## 2022-07-06 NOTE — TELEPHONE ENCOUNTER
Called patient, relayed result message, further testing scheduling. Confirmed date time and place.

## 2022-09-02 ENCOUNTER — HOSPITAL ENCOUNTER (OUTPATIENT)
Dept: RADIOLOGY | Facility: HOSPITAL | Age: 52
Discharge: HOME | End: 2022-09-02
Attending: PSYCHIATRY & NEUROLOGY
Payer: COMMERCIAL

## 2022-09-02 ENCOUNTER — HOSPITAL ENCOUNTER (OUTPATIENT)
Dept: RADIOLOGY | Facility: HOSPITAL | Age: 52
Discharge: HOME | End: 2022-09-02
Attending: PSYCHIATRY & NEUROLOGY

## 2022-09-02 DIAGNOSIS — G43.119 INTRACTABLE MIGRAINE WITH AURA WITHOUT STATUS MIGRAINOSUS: ICD-10-CM

## 2022-09-02 DIAGNOSIS — R90.89 MRI OF BRAIN ABNORMAL: ICD-10-CM

## 2022-09-02 PROCEDURE — 70544 MR ANGIOGRAPHY HEAD W/O DYE: CPT | Mod: 26,,, | Performed by: RADIOLOGY

## 2022-09-02 PROCEDURE — 70544 MR ANGIOGRAPHY HEAD W/O DYE: CPT | Mod: TC,PO

## 2022-09-02 PROCEDURE — 70547 MR ANGIOGRAPHY NECK W/O DYE: CPT | Mod: TC,PO

## 2022-09-02 PROCEDURE — 70547 MRA NECK WITHOUT CONTRAST: ICD-10-PCS | Mod: 26,,, | Performed by: RADIOLOGY

## 2022-09-02 PROCEDURE — 70547 MR ANGIOGRAPHY NECK W/O DYE: CPT | Mod: 26,,, | Performed by: RADIOLOGY

## 2022-09-02 PROCEDURE — 70544 MRA BRAIN WITHOUT CONTRAST: ICD-10-PCS | Mod: 26,,, | Performed by: RADIOLOGY

## 2022-09-07 PROBLEM — G43.909 MIGRAINE: Status: ACTIVE | Noted: 2022-09-07

## 2022-09-08 ENCOUNTER — CLINICAL SUPPORT (OUTPATIENT)
Dept: CARDIOLOGY | Facility: HOSPITAL | Age: 52
End: 2022-09-08
Attending: PSYCHIATRY & NEUROLOGY
Payer: COMMERCIAL

## 2022-09-08 DIAGNOSIS — G43.119 INTRACTABLE MIGRAINE WITH AURA WITHOUT STATUS MIGRAINOSUS: ICD-10-CM

## 2022-09-08 DIAGNOSIS — R90.89 MRI OF BRAIN ABNORMAL: ICD-10-CM

## 2022-09-08 PROCEDURE — 93306 ECHO (CUPID ONLY): ICD-10-PCS | Mod: 26,,, | Performed by: INTERNAL MEDICINE

## 2022-09-08 PROCEDURE — C8929 TTE W OR WO FOL WCON,DOPPLER: HCPCS | Mod: PO

## 2022-09-08 PROCEDURE — 93306 TTE W/DOPPLER COMPLETE: CPT | Mod: 26,,, | Performed by: INTERNAL MEDICINE

## 2022-09-08 NOTE — NURSING NOTE
Bubble study procedure explained. 24g IV started in the left AC, flushed and secured. 30ml of agitated saline injected into IV. IV d/c, cath tip intact. Study complete.

## 2022-09-09 VITALS
WEIGHT: 113 LBS | HEIGHT: 65 IN | HEART RATE: 78 BPM | BODY MASS INDEX: 18.83 KG/M2 | SYSTOLIC BLOOD PRESSURE: 90 MMHG | DIASTOLIC BLOOD PRESSURE: 60 MMHG

## 2022-09-12 LAB
ASCENDING AORTA: 2.66 CM
AV INDEX (PROSTH): 0.8
AV MEAN GRADIENT: 3 MMHG
AV PEAK GRADIENT: 6 MMHG
AV VALVE AREA: 2.18 CM2
AV VELOCITY RATIO: 0.72
BSA FOR ECHO PROCEDURE: 1.53 M2
CV ECHO LV RWT: 0.29 CM
DOP CALC AO PEAK VEL: 1.18 M/S
DOP CALC AO VTI: 25.2 CM
DOP CALC LVOT AREA: 2.7 CM2
DOP CALC LVOT DIAMETER: 1.86 CM
DOP CALC LVOT PEAK VEL: 0.85 M/S
DOP CALC LVOT STROKE VOLUME: 54.86 CM3
DOP CALCLVOT PEAK VEL VTI: 20.2 CM
E WAVE DECELERATION TIME: 227.41 MSEC
E/A RATIO: 0.93
E/E' RATIO: 7.3 M/S
ECHO LV POSTERIOR WALL: 0.65 CM (ref 0.6–1.1)
EJECTION FRACTION: 70 %
FRACTIONAL SHORTENING: 40 % (ref 28–44)
INTERVENTRICULAR SEPTUM: 0.56 CM (ref 0.6–1.1)
IVRT: 105.61 MSEC
LA MAJOR: 3.55 CM
LA MINOR: 4.63 CM
LA WIDTH: 3 CM
LEFT ATRIUM SIZE: 2.56 CM
LEFT ATRIUM VOLUME INDEX: 16.9 ML/M2
LEFT ATRIUM VOLUME: 26.23 CM3
LEFT INTERNAL DIMENSION IN SYSTOLE: 2.7 CM (ref 2.1–4)
LEFT VENTRICLE DIASTOLIC VOLUME INDEX: 59.59 ML/M2
LEFT VENTRICLE DIASTOLIC VOLUME: 92.36 ML
LEFT VENTRICLE MASS INDEX: 51 G/M2
LEFT VENTRICLE SYSTOLIC VOLUME INDEX: 17.5 ML/M2
LEFT VENTRICLE SYSTOLIC VOLUME: 27.14 ML
LEFT VENTRICULAR INTERNAL DIMENSION IN DIASTOLE: 4.5 CM (ref 3.5–6)
LEFT VENTRICULAR MASS: 79.68 G
LV LATERAL E/E' RATIO: 7 M/S
LV SEPTAL E/E' RATIO: 7.64 M/S
LVOT MG: 1.7 MMHG
LVOT MV: 0.62 CM/S
MV PEAK A VEL: 0.9 M/S
MV PEAK E VEL: 0.84 M/S
MV STENOSIS PRESSURE HALF TIME: 65.95 MS
MV VALVE AREA P 1/2 METHOD: 3.34 CM2
PULM VEIN S/D RATIO: 0.9
PV PEAK D VEL: 0.48 M/S
PV PEAK S VEL: 0.43 M/S
RA MAJOR: 3.28 CM
RA PRESSURE: 3 MMHG
RA WIDTH: 3.05 CM
RIGHT VENTRICULAR END-DIASTOLIC DIMENSION: 2.55 CM
RIGHT VENTRICULAR LENGTH IN DIASTOLE (APICAL 4-CHAMBER VIEW): 3.63 CM
RV MID DIAMA: 2.59 CM
RV TISSUE DOPPLER FREE WALL SYSTOLIC VELOCITY 1 (APICAL 4 CHAMBER VIEW): 0.01 CM/S
SINUS: 2.97 CM
STJ: 2.64 CM
TDI LATERAL: 0.12 M/S
TDI SEPTAL: 0.11 M/S
TDI: 0.12 M/S
TRICUSPID ANNULAR PLANE SYSTOLIC EXCURSION: 2.11 CM

## 2022-09-20 ENCOUNTER — TELEPHONE (OUTPATIENT)
Dept: ORTHOPEDICS | Facility: CLINIC | Age: 52
End: 2022-09-20
Payer: COMMERCIAL

## 2022-09-23 ENCOUNTER — OFFICE VISIT (OUTPATIENT)
Dept: NEUROLOGY | Facility: CLINIC | Age: 52
End: 2022-09-23
Payer: COMMERCIAL

## 2022-09-23 VITALS
HEIGHT: 65 IN | BODY MASS INDEX: 18.81 KG/M2 | SYSTOLIC BLOOD PRESSURE: 117 MMHG | HEART RATE: 75 BPM | WEIGHT: 112.88 LBS | DIASTOLIC BLOOD PRESSURE: 67 MMHG

## 2022-09-23 DIAGNOSIS — G43.019 INTRACTABLE MIGRAINE WITHOUT AURA AND WITHOUT STATUS MIGRAINOSUS: Primary | ICD-10-CM

## 2022-09-23 DIAGNOSIS — G43.109 MIGRAINE WITH AURA AND WITHOUT STATUS MIGRAINOSUS, NOT INTRACTABLE: ICD-10-CM

## 2022-09-23 PROCEDURE — 1159F MED LIST DOCD IN RCRD: CPT | Mod: CPTII,S$GLB,, | Performed by: PSYCHIATRY & NEUROLOGY

## 2022-09-23 PROCEDURE — 3074F PR MOST RECENT SYSTOLIC BLOOD PRESSURE < 130 MM HG: ICD-10-PCS | Mod: CPTII,S$GLB,, | Performed by: PSYCHIATRY & NEUROLOGY

## 2022-09-23 PROCEDURE — 1160F PR REVIEW ALL MEDS BY PRESCRIBER/CLIN PHARMACIST DOCUMENTED: ICD-10-PCS | Mod: CPTII,S$GLB,, | Performed by: PSYCHIATRY & NEUROLOGY

## 2022-09-23 PROCEDURE — 99214 PR OFFICE/OUTPT VISIT, EST, LEVL IV, 30-39 MIN: ICD-10-PCS | Mod: S$GLB,,, | Performed by: PSYCHIATRY & NEUROLOGY

## 2022-09-23 PROCEDURE — 99999 PR PBB SHADOW E&M-EST. PATIENT-LVL IV: CPT | Mod: PBBFAC,,, | Performed by: PSYCHIATRY & NEUROLOGY

## 2022-09-23 PROCEDURE — 99214 OFFICE O/P EST MOD 30 MIN: CPT | Mod: S$GLB,,, | Performed by: PSYCHIATRY & NEUROLOGY

## 2022-09-23 PROCEDURE — 3008F BODY MASS INDEX DOCD: CPT | Mod: CPTII,S$GLB,, | Performed by: PSYCHIATRY & NEUROLOGY

## 2022-09-23 PROCEDURE — 99999 PR PBB SHADOW E&M-EST. PATIENT-LVL IV: ICD-10-PCS | Mod: PBBFAC,,, | Performed by: PSYCHIATRY & NEUROLOGY

## 2022-09-23 PROCEDURE — 3008F PR BODY MASS INDEX (BMI) DOCUMENTED: ICD-10-PCS | Mod: CPTII,S$GLB,, | Performed by: PSYCHIATRY & NEUROLOGY

## 2022-09-23 PROCEDURE — 1159F PR MEDICATION LIST DOCUMENTED IN MEDICAL RECORD: ICD-10-PCS | Mod: CPTII,S$GLB,, | Performed by: PSYCHIATRY & NEUROLOGY

## 2022-09-23 PROCEDURE — 3078F PR MOST RECENT DIASTOLIC BLOOD PRESSURE < 80 MM HG: ICD-10-PCS | Mod: CPTII,S$GLB,, | Performed by: PSYCHIATRY & NEUROLOGY

## 2022-09-23 PROCEDURE — 3078F DIAST BP <80 MM HG: CPT | Mod: CPTII,S$GLB,, | Performed by: PSYCHIATRY & NEUROLOGY

## 2022-09-23 PROCEDURE — 1160F RVW MEDS BY RX/DR IN RCRD: CPT | Mod: CPTII,S$GLB,, | Performed by: PSYCHIATRY & NEUROLOGY

## 2022-09-23 PROCEDURE — 3074F SYST BP LT 130 MM HG: CPT | Mod: CPTII,S$GLB,, | Performed by: PSYCHIATRY & NEUROLOGY

## 2022-09-23 RX ORDER — LAMOTRIGINE 25 MG/1
TABLET ORAL
Qty: 120 TABLET | Refills: 6 | Status: SHIPPED | OUTPATIENT
Start: 2022-09-23 | End: 2023-02-13 | Stop reason: SDUPTHER

## 2022-09-23 RX ORDER — SUMATRIPTAN 50 MG/1
50 TABLET, FILM COATED ORAL
Qty: 12 TABLET | Refills: 6 | Status: SHIPPED | OUTPATIENT
Start: 2022-09-23 | End: 2022-12-11 | Stop reason: CLARIF

## 2022-09-23 NOTE — PATIENT INSTRUCTIONS
1- For prevention: increase lamotrigine to 50mg in am and 25mg nightly x 1 week, then 50mg twice a day and continue this dose    2- for onset of migraine treatment: sumatriptan 50mg at onset, can repeat after 2 hours. Max 2/day.

## 2022-09-23 NOTE — PROGRESS NOTES
[facility]- [facility]    Chief complaint: migraine    S    51 Y RH F with migraine, COPD,  uterine cancer, anxiety, chronic right shoulder pain who presents for further evaluation of migraine with aura. She was last seen on 7/22 with migraine with aura (visual), migraine wihtout aura, cervical myofascial pain/Rt shoulder chronic pain. She was started on lamotrigine up to 50mg bid and Sumatriptan 50mg PRN for acute management. She had MRI brain which did not show any acute abnormality, but had a Rt frontal lobe white matter spot of unknown significance thus we opted to do MRA head/neck which did not show any abnormalities and given the aura frequency starting around age 50 then we ordered TTE which did not show any intracardiac shunting.      Since last appointment Pat tells me that she has been doing better. She was confused by lamotrigine instructions so she stayed at 50mg daily. Since then she has noted improvement in the headache. She mentions that she has been having less attacks. She reports that she is down to 1-2 days at most a week and they are lasting <2 hours as she has beent treating them as soon as they start. She has noted improvement in the frequency of the auras as well. Those visual aura have improved since lamotrigine. She has had no side effects from lamotrigine and wanting to increase dose. She finds sumatriptan 50 mg works very well and resolves the attacks quickly to where she is back to functional state.     Headache history from initial evaluation on 7/22:  Age at onset and course over time:  Since childhood they were infrequent and then over the years they have been about the same. She was having them 2-3 times a week or so. She no longer has 4 days attacks since she was younger. She mentions that now headache are shorter, but they are more frequent. Moved to [place] after [place]. She still gets 2 day migraine here and there, but not always. She would treat with  "sleep, rest.  She mentions that she had a severe migraine in 2014 while in [place] and she had to go to ER to be evaluated and treated and was told has caffeine withdrawal headache so she was told to continue taking it.      About 6 months to 1 year she had her first onset of visual aura described as a kaleidoscope which will last from 5 minutes to 60 minutes, suddenly will be a small kaleidoscope and and it will grow in size over the course. She mentions that she has bilateral eye symptoms. She mentions that even when closes her eyes she will have the visual symptoms. She has a visual aura with every headache 1-2/week. The headache will quickly start after that. She mentions that since taking sumatriptan 50mg she will treat immediately will stop the kaleidoscope of colors and avoid the headache.     She will also have headache that will start and move around the head which were happening quite often before, but in the past 3 weeks to 1 month has resolved.   Location: holocephalic   Character: throbbing/pounding, pressure, stabbing, sharp   Intensity:  1-10/10   Frequency: "kaleidoscope eye" visual symptoms -- scintillating scotoma   Duration: >4 hours up to days   Aura: none   Associated symptoms: photophobia, phonophobia,  kinesiophobia, neck tightness/pain, loss of appetite, nausea  Other neurologic symptoms: dizziness, mood changes, difficulty concentrating and doing tasks/problems with relaxation  Precipitating factors: fatigue,  weather changes, stress  Alleviating factors: massage, local pressure, medications  Aggravating factors: exposure to light, sound,when already has one bending over,  Stress  No TVOs  Has chronic tinnitus b/l   No positional component   No thunderclap   Family history of headache: sister, mother, MGM, MGGM  ER/UC visits: none   Caffeine: 2-4 cups coffee/day, while driving 1 mug   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   Meloxicam 15mg- given on 4/22 for shoulder pain     Preventive:  Lexapro 20 mg   Lamotrigine 50mg qd (7/22- present): currently on 50mg only and already has had improvement     Neuroimaging and other studies:   TTE WITH BUBBLE 9/12/22:  Summary    The left ventricle is normal in size with normal systolic function.  The estimated ejection fraction is 70%.  Normal left ventricular diastolic function.  Normal right ventricular size with normal right ventricular systolic function.  The mitral valve appears structurally normal. There is normal leaflet mobility  The aortic valve appears structurally normal. There is normal leaflet mobility  The tricuspid valve appears structurally normal. There is normal leaflet mobility.  Normal central venous pressure (3 mmHg).  There is no evidence of intracardiac shunting.       MRI brain w/o contrast on 7/6/22  CLINICAL HISTORY:  new onset visual scotoma, migraine aura, worsening headache;.  Migraine with aura, not intractable, without status migrainosus     TECHNIQUE:  Multiplanar multisequence MR imaging of the brain was performed without the administration of intravenous contrast.     COMPARISON:  None.     FINDINGS:  Ventricles and sulci are normal in size for age without evidence of hydrocephalus.     Patchy T2/FLAIR hyperintensity within the right frontal periventricular white matter, nonspecific and may reflect sequelae of prior ischemia or injury.  Otherwise, the brain parenchyma appears within normal limits.  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  Diffusion-weighted images demonstrate no evidence of an acute infarct.   Susceptibility weighted images demonstrate no evidence of acute or chronic hemorrhage. No mass effect or midline shift.     Normal vascular flow voids are preserved.     Bone marrow signal intensity is normal. The paranasal sinuses are normal.  Trace right  mastoid fluid.  Orbits are unremarkable.     Mixed intensity nodular 0.8 cm lesion within the midline nasopharynx with slight increased T1 intensity, nonspecific but probably reflects a mucous retention or Tornwaldt cyst.     Impression:     1. No evidence of acute intracranial abnormality.  2. Patchy focus of T2/FLAIR hyperintense signal in the right periventricular white matter, nonspecific and may reflect sequelae of chronic small vessel ischemia or injury.  Otherwise, the brain parenchyma appears within normal limits    Mra head 9/2/22  CLINICAL HISTORY:  migraine aura, finding on MRI concerning for vascular cause.; Migraine with aura, intractable, without status migrainosus.     TECHNIQUE:  Non-contrast 3-D time-of-flight intracranial MR angiography was performed through the [place] [facility] [place] with MIP reformatting.     COMPARISON:  MRI brain 07/06/2022     FINDINGS:  The internal carotid arteries are of normal caliber.  There are no areas of significant atherosclerotic narrowing. Hypoplastic A1 segment of the left anterior cerebral artery with A2 segment supplied by the anterior communicating artery, a normal developmental variant.  The right ONEIDA is normal.  The middle cerebral arteries are normal. The vertebral arteries are patent. The basilar artery is normal.  Hypoplastic left posterior cerebral artery P1 segment, with the P2 segment fed by a dominant left posterior communicating artery, a normal developmental variant.  Right PCA is normal.  There is no aneurysm or vascular malformation identified.  Although MRA is a screening examination, catheter angiography remains the definitive study for small aneurysms, vasculitis, and other vascular abnormalities.     Impression:     No intracranial high-grade stenosis, large vessel occlusion or aneurysm.    MRA neck 9/2/22:  TECHNIQUE:  Time of flight MRA of the carotid and vertebral arteries was performed with 3D reconstructions according to the standard neck MRA  protocol.     COMPARISON:  None     FINDINGS:  The right common carotid artery demonstrates no hemodynamically significant stenosis. The cervical right internal carotid artery demonstrates no hemodynamically significant stenosis, allowing for flow related artifact.     The left common carotid artery demonstrates no hemodynamically significant stenosis. The cervical left internal carotid artery demonstrates no hemodynamically significant stenosis, allowing for flow related artifact.     Extracranial vertebral arteries: Vertebral arteries are codominant.  Vertebral arteries are normal to the level of the foramen magnum, allowing for flow related artifact.     Impression:     No definite hemodynamically significant stenosis or major branch occlusion, allowing for flow related artifact.     Nuclear stress test 10/21:    The EKG portion of this study is negative for ischemia.    There is a mild intensity fixed defect in the anterior wall of the left ventricle, likely secondary to breast attenuation.    The visually estimated ejection fraction is hyperdynamic.    LV cavity size is normal.      ROS: The fourteen point review of system was performed.   Constitutional:  Denies fevers/cold or heat intolerance, weight loss/gain or fatigue.  Eyes:                        Denies diplopia, ptosis, visual field defects or ocular disease   excepting any listed above.  ENT:   +ringing in ears chronic   Cardiovascular:  Denies stroke, CAD, arrhythmia, CHF or other disease excepting any listed above.  Pulmonary:  Denies dyspnea, COPD, RAD or infection or neoplasm excepting any listed above.  Gastrointestinal: Denies ulcer disease, liver or other disease excepting any listed above.  Skin:   Denies rash, skin cancer, or other cutaneous disorder excepting any listed above.  Neurological:  See HPI  Musculoskeletal: +chronic neck pain, right shoulder pain, paresthesias in RUE - getting workup currently by ortho   Heme/Lymphatic: Denies any  blood or lymph system neoplasm or disorder excepting any listed above.  Endocrine:  Denies any thyroid or other disorders, excepting any listed above.  Allergic/Immuno: Denies any autoimmune disease or allergy excepting any listed above.  Psychiatric:  Denies any disorder or treatment excepting any listed above.  Urologic:  Denies any difficulties with the urinary system or sexual function except noted above.    No changes to PMHx, surgical, family history since last appt.     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[employer]     Social History     Tobacco Use    Smoking status: Some Days     Packs/day: 1.00     Types: Cigarettes    Smokeless tobacco: Never    Tobacco comments:     Decreased smoking   Substance Use Topics    Alcohol use: Yes     Comment: social     Drug use: Never     Review of patient's allergies indicates:  No Known Allergies       Current Outpatient Medications:     budesonide-glycopyr-formoterol (BREZTRI AEROSPHERE) 160-9-4.8 mcg/actuation HFAA, Inhale 2 puffs into the lungs 2 (two) times daily., Disp: 10.7 g, Rfl: 5    EScitalopram oxalate (LEXAPRO) 20 MG tablet, Take 1 tablet (20 mg total) by mouth once daily., Disp: 90 tablet, Rfl: 1    lamoTRIgine (LAMICTAL) 25 MG tablet, 1 tab po daily x 1 week, then 1 tab po bid x 1 week, then 1 tab in am and 2 tabs qhs x 1 week, then 2 tabs bid and continue this dose., Disp: 120 tablet, Rfl: 6    meloxicam (MOBIC) 15 MG tablet, TAKE 1 TABLET(15 MG) BY MOUTH EVERY DAY, Disp: 30 tablet, Rfl: 2    sumatriptan (IMITREX) 50 MG tablet, Take 1 tablet (50 mg total) by mouth every 2 (two) hours as needed for Migraine (no more than two doses in a day)., Disp: 8 tablet, Rfl: 1    tramadol-acetaminophen 37.5-325 mg (ULTRACET) 37.5-325 mg Tab, Take 1 tablet by mouth every 4 (four) hours as needed for Pain., Disp: 12 tablet, Rfl: 0      PHYSICAL EXAMINATION  Vitals:    09/23/22 1428   BP: 117/67   Pulse: 75   General: The patient is a well-developed, well-nourished  looking of stated age in no acute distress.  NEUROLOGIC EXAM:  MENTAL: The patient is awake, alert and oriented times to time, place, location and situation. Intact recent memory, attention, concentration. Language and speech are normal. No aphasia, no dysarthria  CRANIAL NERVES:   EOMI, no facial asymmetry    MOTOR EXAM: shows normal strength ( 5/5 strength )in all 4 extremities. Tone is normal.   CEREBELLAR EXAM: no dysmetria in UEs  GAIT/STANCE: Standard gait was normal with normal stride, arm swing and turning.        Impression:  Migraine with aura - visual - has for now 6 months to 1 year a scintillating scotoma that lasts from 5 minutes to 60minutes, typically shorter about 5-10 minutes and then quickly the migraine headche will begin. She is having about 1-2 per week or so. She is unclear of any specific triggers for it. 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     Migraine without aura- long standing history of migraine without aura which was a few times a week or more at times daily since childhood, strong family history in maternal side. Her migraine headache frequency has reduced to 1-2 a week at most and sumatriptan 50mg resolves it. Will increase lamotrigine to goal of 50mg bid to improve the management.     Cervical myofascial pain/Rt shoulder pain chronic - burnig pain in the C4-C5 distribution in RUE, she had also having RUE paresthesias/burning int he whole arm mainly, but main pain is the elbow to shoulder and shooting pain on b/l sides anterior/posterior of the 3rd to 5th digits. Awaiting EMG from Dr. Dale and following with orthopedics Dr. Wilde. She had some injury as well with a peripheral blood draw where she says she had hematoma all in her arm and had some paresthesias from that  as well. DDx: cervical radiculopathy, cervical myofascial pain, cervical radic + shoulder pathology. Consider neuroimaging with C spine MRI. Agree with EMG.     Comorbidities:  Anxiety - on lexapro 20mg well controlled  COPD- on inhalers, +smoking     Plan:   1- For preventive management: A. Start magnesium 250mg to 500mg daily and Vitamin b2 200mg twice a day           B. Lamotrigine: increase to 20mg in the morning and 25mg at bedtime for 1 week, then increase to 50mg twice a day and continue at this dose.     2- Acute management: sumatriptan 50mg at onset of attack, can repeat after 2 hours. Max 2/day.         RTC in 3 MONTHS. She will be transitioned to my colleagues in headache clinic as I am leaving [facility].         Allyssa Davis MD   Board Certified Neurologist  [facility] Certified Headache Medicine

## 2022-11-02 ENCOUNTER — OFFICE VISIT (OUTPATIENT)
Dept: ORTHOPEDICS | Facility: CLINIC | Age: 52
End: 2022-11-02
Payer: COMMERCIAL

## 2022-11-02 VITALS — HEIGHT: 65 IN | BODY MASS INDEX: 18.81 KG/M2 | WEIGHT: 112.88 LBS

## 2022-11-02 DIAGNOSIS — G56.11 MEDIAN NEUROPATHY AT ELBOW, RIGHT: Primary | ICD-10-CM

## 2022-11-02 PROCEDURE — 99203 OFFICE O/P NEW LOW 30 MIN: CPT | Mod: S$GLB,,, | Performed by: ORTHOPAEDIC SURGERY

## 2022-11-02 PROCEDURE — 99999 PR PBB SHADOW E&M-EST. PATIENT-LVL III: ICD-10-PCS | Mod: PBBFAC,,, | Performed by: ORTHOPAEDIC SURGERY

## 2022-11-02 PROCEDURE — 3008F BODY MASS INDEX DOCD: CPT | Mod: CPTII,S$GLB,, | Performed by: ORTHOPAEDIC SURGERY

## 2022-11-02 PROCEDURE — 99203 PR OFFICE/OUTPT VISIT, NEW, LEVL III, 30-44 MIN: ICD-10-PCS | Mod: S$GLB,,, | Performed by: ORTHOPAEDIC SURGERY

## 2022-11-02 PROCEDURE — 1159F MED LIST DOCD IN RCRD: CPT | Mod: CPTII,S$GLB,, | Performed by: ORTHOPAEDIC SURGERY

## 2022-11-02 PROCEDURE — 1159F PR MEDICATION LIST DOCUMENTED IN MEDICAL RECORD: ICD-10-PCS | Mod: CPTII,S$GLB,, | Performed by: ORTHOPAEDIC SURGERY

## 2022-11-02 PROCEDURE — 99999 PR PBB SHADOW E&M-EST. PATIENT-LVL III: CPT | Mod: PBBFAC,,, | Performed by: ORTHOPAEDIC SURGERY

## 2022-11-02 PROCEDURE — 3008F PR BODY MASS INDEX (BMI) DOCUMENTED: ICD-10-PCS | Mod: CPTII,S$GLB,, | Performed by: ORTHOPAEDIC SURGERY

## 2022-11-02 NOTE — PROGRESS NOTES
11/2/2022    Chief Complaint:  No chief complaint on file.      HPI:  Pat Freed is a 51 y.o. female, who presents to clinic today she has a history of pain numbness tingling to the right elbow hand.  States that this was going on for couple months and is unsure whether this is related to a blood draw from [facility] several years ago.  States that she has had a nerve conduction study performed by Dr. Dale. she has been seen by Dr. Johnson in the past.    PMHX:  Past Medical History:   Diagnosis Date    Headache     Pneumothorax     Uterus cancer        PSHX:  Past Surgical History:   Procedure Laterality Date    CHEST TUBE INSERTION Left     HYSTERECTOMY         FMHX:  Family History   Problem Relation Age of Onset    Thyroid disease Mother     Cancer Father         Colon    Heart disease Father     Heart disease Paternal Grandmother     Skin cancer Paternal Grandmother     Heart disease Paternal Grandfather     Heart attack Paternal Uncle     Breast cancer Maternal Aunt     Ovarian cancer Neg Hx        SOCHX:  Social History     Tobacco Use    Smoking status: Some Days     Packs/day: 1.00     Types: Cigarettes    Smokeless tobacco: Never    Tobacco comments:     Decreased smoking   Substance Use Topics    Alcohol use: Yes     Comment: social        ALLERGIES:  Patient has no known allergies.    CURRENT MEDICATIONS:  Current Outpatient Medications on File Prior to Visit   Medication Sig Dispense Refill    budesonide-glycopyr-formoterol (BREZTRI AEROSPHERE) 160-9-4.8 mcg/actuation HFAA Inhale 2 puffs into the lungs 2 (two) times daily. 10.7 g 5    EScitalopram oxalate (LEXAPRO) 20 MG tablet Take 1 tablet (20 mg total) by mouth once daily. 90 tablet 1    lamoTRIgine (LAMICTAL) 25 MG tablet 2 tabs po bid 120 tablet 6    meloxicam (MOBIC) 15 MG tablet TAKE 1 TABLET(15 MG) BY MOUTH EVERY DAY 30 tablet 2    sumatriptan (IMITREX) 50 MG tablet Take 1 tablet (50 mg total) by mouth as needed for Migraine (no more  than two doses in a day). 12 tablet 6     No current facility-administered medications on file prior to visit.       REVIEW OF SYSTEMS:  Review of Systems   Neurological:  Positive for tingling.     GENERAL PHYSICAL EXAM:   There were no vitals taken for this visit.   GEN: well developed, well nourished, no acute distress   HENT: Normocephalic, atraumatic   EYES: No discharge, conjunctiva normal   NECK: Supple, non-tender   PULM: No wheezing, no respiratory distress   CV: RRR   ABD: Soft, non-tender    ORTHO EXAM:   Examination of the right upper extremity reveals that there is no edema.  There are no major skin changes.  Palpation produces no tenderness. She does report mild decreased sensation throughout the median distribution.  Ulnar and radial sensation is grossly intact.  She has negative Tinel's and Durkan's at the wrist but she does have a positive Tinel's over the region of the elbow anteriorly.  She is able to flex and extend the fingers.  She does have a 2+ radial pulse.    RADIOLOGY:   Review of the nerve conduction study from Dr. Dale has been performed.  There was noted to be evidence of compression of the median nerve at the level of the lacertus fibrosus and ligament of [place].    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 Have discussed treatment going forward with the patient.  I have discussed the possibility of releasing the nerve at the proximal site the anterior elbow.    2.  The patient will follow up with me to discuss further planning and treatment

## 2022-12-11 PROBLEM — Z72.0 TOBACCO ABUSE: Status: ACTIVE | Noted: 2022-12-11

## 2022-12-11 PROBLEM — Z71.89 ACP (ADVANCE CARE PLANNING): Status: ACTIVE | Noted: 2022-12-11

## 2022-12-11 PROBLEM — J44.1 COPD EXACERBATION: Status: ACTIVE | Noted: 2022-12-11

## 2022-12-11 PROBLEM — J18.9 PNEUMONIA OF LEFT LUNG DUE TO INFECTIOUS ORGANISM: Status: ACTIVE | Noted: 2022-12-11

## 2022-12-11 PROBLEM — R09.02 HYPOXIA: Status: ACTIVE | Noted: 2022-12-11

## 2022-12-12 PROBLEM — J96.01 ACUTE HYPOXEMIC RESPIRATORY FAILURE: Status: ACTIVE | Noted: 2022-12-12

## 2022-12-13 PROBLEM — R06.02 SOB (SHORTNESS OF BREATH): Status: ACTIVE | Noted: 2022-12-13

## 2023-02-13 ENCOUNTER — OFFICE VISIT (OUTPATIENT)
Dept: NEUROLOGY | Facility: CLINIC | Age: 53
End: 2023-02-13
Payer: COMMERCIAL

## 2023-02-13 VITALS
SYSTOLIC BLOOD PRESSURE: 112 MMHG | WEIGHT: 123.25 LBS | HEIGHT: 65 IN | BODY MASS INDEX: 20.54 KG/M2 | RESPIRATION RATE: 17 BRPM | HEART RATE: 74 BPM | DIASTOLIC BLOOD PRESSURE: 69 MMHG

## 2023-02-13 DIAGNOSIS — G43.109 MIGRAINE WITH AURA AND WITHOUT STATUS MIGRAINOSUS, NOT INTRACTABLE: Primary | ICD-10-CM

## 2023-02-13 DIAGNOSIS — G43.019 INTRACTABLE MIGRAINE WITHOUT AURA AND WITHOUT STATUS MIGRAINOSUS: ICD-10-CM

## 2023-02-13 PROCEDURE — 1159F MED LIST DOCD IN RCRD: CPT | Mod: CPTII,S$GLB,, | Performed by: NURSE PRACTITIONER

## 2023-02-13 PROCEDURE — 1160F RVW MEDS BY RX/DR IN RCRD: CPT | Mod: CPTII,S$GLB,, | Performed by: NURSE PRACTITIONER

## 2023-02-13 PROCEDURE — 99213 OFFICE O/P EST LOW 20 MIN: CPT | Mod: S$GLB,,, | Performed by: NURSE PRACTITIONER

## 2023-02-13 PROCEDURE — 99999 PR PBB SHADOW E&M-EST. PATIENT-LVL IV: ICD-10-PCS | Mod: PBBFAC,,, | Performed by: NURSE PRACTITIONER

## 2023-02-13 PROCEDURE — 3008F BODY MASS INDEX DOCD: CPT | Mod: CPTII,S$GLB,, | Performed by: NURSE PRACTITIONER

## 2023-02-13 PROCEDURE — 99213 PR OFFICE/OUTPT VISIT, EST, LEVL III, 20-29 MIN: ICD-10-PCS | Mod: S$GLB,,, | Performed by: NURSE PRACTITIONER

## 2023-02-13 PROCEDURE — 3078F PR MOST RECENT DIASTOLIC BLOOD PRESSURE < 80 MM HG: ICD-10-PCS | Mod: CPTII,S$GLB,, | Performed by: NURSE PRACTITIONER

## 2023-02-13 PROCEDURE — 99999 PR PBB SHADOW E&M-EST. PATIENT-LVL IV: CPT | Mod: PBBFAC,,, | Performed by: NURSE PRACTITIONER

## 2023-02-13 PROCEDURE — 3074F SYST BP LT 130 MM HG: CPT | Mod: CPTII,S$GLB,, | Performed by: NURSE PRACTITIONER

## 2023-02-13 PROCEDURE — 3078F DIAST BP <80 MM HG: CPT | Mod: CPTII,S$GLB,, | Performed by: NURSE PRACTITIONER

## 2023-02-13 PROCEDURE — 3074F PR MOST RECENT SYSTOLIC BLOOD PRESSURE < 130 MM HG: ICD-10-PCS | Mod: CPTII,S$GLB,, | Performed by: NURSE PRACTITIONER

## 2023-02-13 PROCEDURE — 1159F PR MEDICATION LIST DOCUMENTED IN MEDICAL RECORD: ICD-10-PCS | Mod: CPTII,S$GLB,, | Performed by: NURSE PRACTITIONER

## 2023-02-13 PROCEDURE — 1160F PR REVIEW ALL MEDS BY PRESCRIBER/CLIN PHARMACIST DOCUMENTED: ICD-10-PCS | Mod: CPTII,S$GLB,, | Performed by: NURSE PRACTITIONER

## 2023-02-13 PROCEDURE — 3008F PR BODY MASS INDEX (BMI) DOCUMENTED: ICD-10-PCS | Mod: CPTII,S$GLB,, | Performed by: NURSE PRACTITIONER

## 2023-02-13 RX ORDER — LAMOTRIGINE 25 MG/1
50 TABLET ORAL 2 TIMES DAILY
Qty: 120 TABLET | Refills: 11 | Status: SHIPPED | OUTPATIENT
Start: 2023-02-13 | End: 2023-07-21

## 2023-02-13 NOTE — PROGRESS NOTES
"Date of service: 2/13/2023  Referring provider: No ref. provider found    Subjective:      Chief complaint: Headache       Patient ID: Pat Freed is a 52 y.o. female with COPD, neck pain who presents for follow up of headache    She was previously followed by Dr. Cerna and is transferring care to me     History of Present Illness    INTERVAL HISTORY 2/13/23    Last visit was five months ago with Dr. Cerna. At that time she was doing better.    Today she reports she is about the same. Headaches occur in the eyes and around the eyes. Current pain 2 with range 0-10. She has about one migraine per week or less. No aura since last visit.     ORIGINAL HEADACHE HISTORY - from 7/5/22 with Dr. Cerna   Age at onset and course over time: Since childhood they were infrequent and then over the years they have been about the same    Aura - kaleidoscope which will last from 5 minutes to 60 minutes, suddenly will be a small kaleidoscope and and it will grow in size over the course. She mentions that she has bilateral eye symptoms. She mentions that even when closes her eyes she will have the visual symptoms. She has a visual aura with every headache 1-2/week. The headache will quickly start after that. She mentions that since taking sumatriptan 50mg she will treat immediately will stop the kaleidoscope of colors and avoid the headache.     Location: holocephalic   Character: throbbing/pounding, pressure, stabbing, sharp   Intensity:  1-10/10   Frequency: "kaleidoscope eye" visual symptoms -- scintillating scotoma   Duration: >4 hours up to days   Aura: none   Associated symptoms: photophobia, phonophobia,  kinesiophobia, neck tightness/pain, loss of appetite, nausea  Other neurologic symptoms: dizziness, mood changes, difficulty concentrating and doing tasks/problems with relaxation  Precipitating factors: fatigue,  weather changes, stress  Alleviating factors: massage, local pressure, medications  Aggravating factors: " exposure to light, sound,when already has one bending over,  Stress  No TVOs  Has chronic tinnitus b/l   No positional component   No thunderclap   Family history of headache: sister, mother, MGM, MGGM  ER/UC visits: none   Caffeine: 2-4 cups coffee/day, while driving 1 mug   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 10.7 g 11    EScitalopram oxalate (LEXAPRO) 20 MG tablet Take 1 tablet (20 mg total) by mouth once daily. 90 tablet 1    fluticasone propionate (FLONASE) 50 mcg/actuation nasal spray 2 sprays by Each Nostril route daily as needed for Rhinitis. otc      meloxicam (MOBIC) 15 MG tablet TAKE 1 TABLET(15 MG) BY MOUTH EVERY DAY (Patient taking differently: Take 15 mg by mouth once daily.) 30 tablet 2    sumatriptan (IMITREX) 50 MG tablet Take 50 mg by mouth daily as needed for Migraine (no more than 2 doses in a day).      tetrahydrozoline/polyethyl gly (EYE DROPS OPHT) Place 1 drop into both eyes daily as needed (eye gunk).      albuterol (PROAIR HFA) 90 mcg/actuation inhaler Inhale 2 puffs into the lungs every 4 (four) hours as needed for Shortness of Breath. Rescue 6.7 g 0    albuterol-ipratropium (DUO-NEB) 2.5 mg-0.5 mg/3 mL nebulizer solution Take 3 mLs by nebulization every 6 (six) hours as needed for Wheezing. Rescue (Patient not taking: Reported on 12/30/2022) 75 mL 0    benzonatate (TESSALON PERLES) 100 MG capsule Take 1 capsule (100 mg total) by mouth 2 (two) times a day.  (Patient not taking: Reported on 12/30/2022)      famotidine (PEPCID) 20 MG tablet Take 1 tablet (20 mg total) by mouth 2 (two) times daily. for 14 days 28 tablet 0    lamoTRIgine (LAMICTAL) 25 MG tablet Take 2 tablets (50 mg total) by mouth 2 (two) times daily. 120 tablet 11     No current facility-administered medications for this visit.       Past Medical History  Past Medical History:   Diagnosis Date    Headache     Pneumothorax     Uterus cancer        Past Surgical History  Past Surgical History:   Procedure Laterality Date    CHEST TUBE INSERTION Left     HYSTERECTOMY         Family History  Family History   Problem Relation Age of Onset    Thyroid disease Mother     Cancer Father         Colon    Heart disease Father     Heart disease Paternal Grandmother     Skin cancer Paternal Grandmother     Heart disease Paternal Grandfather     Heart attack Paternal Uncle     Breast cancer Maternal Aunt     Ovarian cancer Neg Hx        Social History  Social History     Socioeconomic History    Marital status:    Tobacco Use    Smoking status: Some Days     Packs/day: 1.00     Types: Cigarettes    Smokeless tobacco: Never    Tobacco comments:     Decreased smoking   Substance and Sexual Activity    Alcohol use: Yes     Comment: social     Drug use: Never    Sexual activity: Yes     Partners: Male     Birth control/protection: See Surgical Hx     Social Determinants of Health     Financial Resource Strain: Unknown    Difficulty of Paying Living Expenses: Patient refused   Food Insecurity: No Food Insecurity    Worried About Running Out of Food in the Last Year: Never true    Ran Out of Food in the Last Year: Never true   Transportation Needs: No Transportation Needs    Lack of Transportation (Medical): No    Lack of Transportation (Non-Medical): No   Physical Activity: Inactive    Days of Exercise per Week: 0 days    Minutes of Exercise per Session: 0 min   Stress: No Stress Concern Present    Feeling of Stress  : Only a little   Social Connections: Moderately Isolated    Frequency of Communication with Friends and Family: More than three times a week    Frequency of Social Gatherings with Friends and Family: Once a week    Attends Taoism Services: Never    Active Member of Clubs or Organizations: No    Attends Club or Organization Meetings: Patient refused    Marital Status:    Housing Stability: Low Risk     Unable to Pay for Housing in the Last Year: No    Number of Places Lived in the Last Year: 1    Unstable Housing in the Last Year: No        Review of Systems  14-point review of systems as follows:   No check bakari indicates NEGATIVE response   Constitutional: [] weight loss, [] change to appetite   Eyes: [] change in vision, [] double vision   Ears, nose, mouth, throat: [] frequent nose bleeds, [x] ringing in the ears   Respiratory: [x] cough, [] wheezing   Cardiovascular: [] chest pain, [] palpitations   Gastrointestinal: [] jaundice, [] nausea/vomiting   Genitourinary: [] incontinence, [] burning with urination   Hematologic/lymphatic: [] easy bruising/bleeding, [x] night sweats   Neurological: [] numbness, [] weakness   Endocrine: [x] fatigue, [] heat/cold intolerance   Allergy/Immunologic: [] fevers, [x] chills   Musculoskeletal: [x] muscle pain, [] joint pain   Psychiatric: [] thoughts of harming self/others, [] depression   Integumentary: [] rashes, [] sores that do not heal     Objective:        Vitals:    02/13/23 1152   BP: 112/69   Pulse: 74   Resp: 17     Body mass index is 20.51 kg/m².    2/13/23  Constitutional:   She appears well-developed and well-nourished. She is well groomed     Neurological Exam:  General: well-developed, well-nourished, no distress  Mental status: Awake and alert  Speech language: No dysarthria or aphasia on conversation  Cranial nerves: Face symmetric  Motor: Moves all extremities well  Coordination: No ataxia. No tremor.      Data Review:     I have personally reviewed  the referring provider's notes, labs, & imaging made available to me today.      RADIOLOGY STUDIES:  I have personally reviewed the pertinent images performed.       Results for orders placed or performed during the hospital encounter of 09/02/22   MRA Brain without contrast    Narrative    EXAMINATION:  MRA BRAIN WITHOUT CONTRAST    CLINICAL HISTORY:  migraine aura, finding on MRI concerning for vascular cause.; Migraine with aura, intractable, without status migrainosus.    TECHNIQUE:  Non-contrast 3-D time-of-flight intracranial MR angiography was performed through the [facility] with MIP reformatting.    COMPARISON:  MRI brain 07/06/2022    FINDINGS:  The internal carotid arteries are of normal caliber.  There are no areas of significant atherosclerotic narrowing. Hypoplastic A1 segment of the left anterior cerebral artery with A2 segment supplied by the anterior communicating artery, a normal developmental variant.  The right ONEIDA is normal.  The middle cerebral arteries are normal. The vertebral arteries are patent. The basilar artery is normal.  Hypoplastic left posterior cerebral artery P1 segment, with the P2 segment fed by a dominant left posterior communicating artery, a normal developmental variant.  Right PCA is normal.  There is no aneurysm or vascular malformation identified.  Although MRA is a screening examination, catheter angiography remains the definitive study for small aneurysms, vasculitis, and other vascular abnormalities.      Impression    No intracranial high-grade stenosis, large vessel occlusion or aneurysm.      Electronically signed by: Ralph Loyd  Date:    09/02/2022  Time:    12:17   Results for orders placed or performed during the hospital encounter of 07/06/22   MRI Brain Without Contrast    Narrative    EXAMINATION:  MRI BRAIN WITHOUT CONTRAST    CLINICAL HISTORY:  new onset visual scotoma, migraine aura, worsening headache;.  Migraine with aura, not intractable, without  status migrainosus    TECHNIQUE:  Multiplanar multisequence MR imaging of the brain was performed without the administration of intravenous contrast.    COMPARISON:  None.    FINDINGS:  Ventricles and sulci are normal in size for age without evidence of hydrocephalus.    Patchy T2/FLAIR hyperintensity within the right frontal periventricular white matter, nonspecific and may reflect sequelae of prior ischemia or injury.  Otherwise, the brain parenchyma appears within normal limits.  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  Diffusion-weighted images demonstrate no evidence of an acute infarct.   Susceptibility weighted images demonstrate no evidence of acute or chronic hemorrhage. No mass effect or midline shift.    Normal vascular flow voids are preserved.    Bone marrow signal intensity is normal. The paranasal sinuses are normal.  Trace right mastoid fluid.  Orbits are unremarkable.    Mixed intensity nodular 0.8 cm lesion within the midline nasopharynx with slight increased T1 intensity, nonspecific but probably reflects a mucous retention or Tornwaldt cyst.      Impression    1. No evidence of acute intracranial abnormality.  2. Patchy focus of T2/FLAIR hyperintense signal in the right periventricular white matter, nonspecific and may reflect sequelae of chronic small vessel ischemia or injury.  Otherwise, the brain parenchyma appears within normal limits.      Electronically signed by: Ralph Loyd  Date:    07/06/2022  Time:    09:31       Lab Results   Component Value Date     12/15/2022    K 4.6 12/15/2022    CL 99 12/15/2022    CO2 37 (H) 12/15/2022    BUN 18 12/15/2022    CREATININE 0.60 12/15/2022    GLU 92 12/15/2022    AST 27 12/15/2022    ALT 22 12/15/2022    ALBUMIN 4.3 12/15/2022    PROT 7.0 12/15/2022    BILITOT 0.5 12/15/2022    CHOL 251 (H) 09/07/2022    HDL 60 09/07/2022    LDLCALC 161.4 (H) 09/07/2022    TRIG 148 09/07/2022       Lab Results   Component Value Date    WBC 6.80 12/15/2022     HGB 15.4 12/15/2022    HCT 46.9 12/15/2022    MCV 93 12/15/2022     12/15/2022       Lab Results   Component Value Date    TSH 1.020 09/07/2022           Assessment & Plan:       Problem List Items Addressed This Visit          Neuro    Migraine - Primary    Relevant Medications    lamoTRIgine (LAMICTAL) 25 MG tablet           Please call our clinic at [phone] or send a message on the [facility] portal if there are any changes to the plan described below, for example,if you are not contacted for the requested tests, referral(s) within one week, if you are unable to receive the medications prescribed, or if you feel you need to change the treatment course for any reason.     TESTING:  -- none    REFERRALS:  -- none    PREVENTION (use daily regardless of headache):  -- continue lamotrigine and Lexapro    AS-NEEDED TREATMENT (use total no more than 10 days per month unless otherwise stated):  -- continue sumatriptan     Follow up in about 6 months (around 8/13/2023) for follow up with FLORINA.    Jigna New NP

## 2023-02-13 NOTE — PATIENT INSTRUCTIONS
Please call our clinic at [phone] or send a message on the [facility] portal if there are any changes to the plan described below, for example,if you are not contacted for the requested tests, referral(s) within one week, if you are unable to receive the medications prescribed, or if you feel you need to change the treatment course for any reason.     TESTING:  -- none    REFERRALS:  -- none    PREVENTION (use daily regardless of headache):  -- continue lamotrigine and Lexapro    AS-NEEDED TREATMENT (use total no more than 10 days per month unless otherwise stated):  -- continue sumatriptan

## 2023-03-20 PROBLEM — J18.9 PNEUMONIA OF LEFT LUNG DUE TO INFECTIOUS ORGANISM: Status: RESOLVED | Noted: 2022-12-11 | Resolved: 2023-03-20

## 2023-03-20 PROBLEM — J96.01 ACUTE HYPOXEMIC RESPIRATORY FAILURE: Status: RESOLVED | Noted: 2022-12-12 | Resolved: 2023-03-20

## 2024-01-02 PROBLEM — R20.0 NUMBNESS AND TINGLING OF RIGHT ARM: Status: RESOLVED | Noted: 2022-05-20 | Resolved: 2024-01-02

## 2024-01-02 PROBLEM — R20.2 NUMBNESS AND TINGLING OF RIGHT ARM: Status: RESOLVED | Noted: 2022-05-20 | Resolved: 2024-01-02

## 2024-01-02 PROBLEM — Z99.81 ON HOME OXYGEN THERAPY: Status: ACTIVE | Noted: 2024-01-02

## 2024-01-02 PROBLEM — J44.9 COPD WITH HYPOXIA: Status: ACTIVE | Noted: 2022-12-11

## 2024-01-02 PROBLEM — J44.1 COPD EXACERBATION: Status: RESOLVED | Noted: 2022-12-11 | Resolved: 2024-01-02

## 2024-12-14 ENCOUNTER — OFFICE VISIT (OUTPATIENT)
Dept: URGENT CARE | Facility: CLINIC | Age: 54
End: 2024-12-14

## 2024-12-14 VITALS
OXYGEN SATURATION: 98 % | RESPIRATION RATE: 20 BRPM | HEART RATE: 62 BPM | BODY MASS INDEX: 18.73 KG/M2 | HEIGHT: 65 IN | WEIGHT: 112.44 LBS | SYSTOLIC BLOOD PRESSURE: 117 MMHG | DIASTOLIC BLOOD PRESSURE: 72 MMHG | TEMPERATURE: 98 F

## 2024-12-14 DIAGNOSIS — L03.90 CELLULITIS, UNSPECIFIED CELLULITIS SITE: Primary | ICD-10-CM

## 2024-12-14 PROCEDURE — 99213 OFFICE O/P EST LOW 20 MIN: CPT | Mod: S$GLB,,, | Performed by: NURSE PRACTITIONER

## 2024-12-14 RX ORDER — MUPIROCIN 20 MG/G
OINTMENT TOPICAL 3 TIMES DAILY
Qty: 45 G | Refills: 0 | Status: SHIPPED | OUTPATIENT
Start: 2024-12-14

## 2024-12-14 RX ORDER — SULFAMETHOXAZOLE AND TRIMETHOPRIM 800; 160 MG/1; MG/1
1 TABLET ORAL 2 TIMES DAILY
Qty: 14 TABLET | Refills: 0 | Status: SHIPPED | OUTPATIENT
Start: 2024-12-14

## 2024-12-14 NOTE — PROGRESS NOTES
"Subjective:      Patient ID: Pat Freed is a 54 y.o. female.    Vitals:  height is 5' 5" (1.651 m) and weight is 51 kg (112 lb 7 oz). Her oral temperature is 98 °F (36.7 °C). Her blood pressure is 117/72 and her pulse is 62. Her respiration is 20 and oxygen saturation is 98%.     Chief Complaint: Finger Pain    Pt present with RT hand 3rd finger pain, blistering, bleeding. States she jammed in her finger happened 1 week ago. Tx include OTC ointment and warm water soak with no relief.     Pain  This is a new problem. The current episode started 1 to 4 weeks ago. The problem occurs constantly. The problem has been unchanged. Pertinent negatives include no nausea or neck pain. Nothing aggravates the symptoms. The treatment provided no relief.       Neck: Negative for neck pain.   Gastrointestinal:  Negative for nausea.   Skin:  Negative for erythema.      Objective:     Physical Exam   Constitutional: She is oriented to person, place, and time. She appears well-developed. She is cooperative.   HENT:   Head: Normocephalic and atraumatic. Head is without abrasion, without contusion and without laceration.   Ears:   Right Ear: Hearing, tympanic membrane, external ear and ear canal normal.   Left Ear: Hearing, tympanic membrane, external ear and ear canal normal.   Nose: Nose normal. No mucosal edema or nasal deformity. No epistaxis. Right sinus exhibits no maxillary sinus tenderness and no frontal sinus tenderness. Left sinus exhibits no maxillary sinus tenderness and no frontal sinus tenderness.   Mouth/Throat: Uvula is midline, oropharynx is clear and moist and mucous membranes are normal. No trismus in the jaw. Normal dentition. No uvula swelling.   Eyes: Conjunctivae, EOM and lids are normal. Pupils are equal, round, and reactive to light.   Neck: Trachea normal and phonation normal. Neck supple.   Cardiovascular: Normal rate, regular rhythm, normal heart sounds and normal pulses.   Pulmonary/Chest: Effort " normal and breath sounds normal. No stridor. No respiratory distress.   Abdominal: Normal appearance and bowel sounds are normal. Soft.   Musculoskeletal: Normal range of motion.         General: Normal range of motion.      Right hand: Right middle finger: Exhibits swelling and tenderness (erythema).   Neurological: She is alert and oriented to person, place, and time. She exhibits normal muscle tone.   Skin: Skin is warm, dry, intact and no rash. Capillary refill takes less than 2 seconds. No abrasion, No burn, No bruising, No erythema and No ecchymosis   Psychiatric: Her speech is normal and behavior is normal. Judgment and thought content normal.   Nursing note and vitals reviewed.      Assessment:     1. Cellulitis, unspecified cellulitis site        Plan:       Cellulitis, unspecified cellulitis site  -     sulfamethoxazole-trimethoprim 800-160mg (BACTRIM DS) 800-160 mg Tab; Take 1 tablet by mouth 2 (two) times daily.  Dispense: 14 tablet; Refill: 0  -     mupirocin (BACTROBAN) 2 % ointment; Apply topically 3 (three) times daily.  Dispense: 45 g; Refill: 0      INSTRUCTIONS:  - Rest.  - Drink plenty of fluids.  - Take Tylenol and/or Ibuprofen as directed as needed for fever/pain.  Do not take more than the recommended dose.  - follow up with your PCP within the next 1-2 weeks as needed.  - You must understand that you have received an Urgent Care treatment only and that you may be released before all of your medical problems are known or treated.   - You, the patient, will arrange for follow up care as instructed.   - If your condition worsens or fails to improve we recommend that you receive another evaluation at the ER immediately or contact your PCP to discuss your concerns.   - You can call [phone] or [phone] to help schedule an appointment with the appropriate provider.     -If you smoke cigarettes, it would be beneficial for you to stop.    Monitor for new or worsening symptoms    OTC for  symptom control    Recommend follow up with PCP/Pediatrician if symptoms are worsening

## 2025-06-02 ENCOUNTER — TELEPHONE (OUTPATIENT)
Dept: HEMATOLOGY/ONCOLOGY | Facility: CLINIC | Age: 55
End: 2025-06-02

## 2025-06-12 ENCOUNTER — TELEPHONE (OUTPATIENT)
Dept: HEMATOLOGY/ONCOLOGY | Facility: CLINIC | Age: 55
End: 2025-06-12
Payer: COMMERCIAL

## 2025-06-12 NOTE — TELEPHONE ENCOUNTER
Spoke with pt in regards to not being seen today d/t being late. Pt upset states that she was in the lobby at 1:05 but was not fully checked in for over 10 mins. Apologized to pt and got her r/s for next week. Pt req to be called if sooner appt comes available. Advised pt I would add to waitlist. Pt PALLAVI.

## 2025-06-15 PROBLEM — R63.4 UNINTENTIONAL WEIGHT LOSS: Status: ACTIVE | Noted: 2025-06-15

## 2025-06-15 PROBLEM — Z12.11 SCREENING FOR COLON CANCER: Status: ACTIVE | Noted: 2022-12-11

## 2025-06-15 NOTE — PROGRESS NOTES
Subjective:       Name: Pat Freed  : 1970  MRN: [id number]    Chief Complaint   Patient presents with    unintentional weight loss    and polycythemia        Patient is in clinic with her     HPI: Pat Freed is a 54 y.o. female presents for evaluation of unintentional weight loss and and polycythemia      The patient is reporting feeling tired.  On a recent wellness visit with her PCP she was found to have lost 10 lb unintentionally.  She eats 1-2 meals a day since she is a .  Her appetite has been preserved.  She reports that she has not noticed any changes in bowel habits. She has noticed a small amount of blood in the stool does have history of hemorrhoids.  Her last colonoscopy was in  and it showed the presence of internal hemorrhoids and polyps that were removed.  A repeat 1 was suggested in 3 years in view of her family history of colon cancer.    She started smoking at the age of 9 yo. She used to smoke 2 packs a days in her 20-30's. Now she cut down to half a pack a day.  The patient denies any personal history of alcohol abuse.  She has been noticing worsening of her shortness of breath.  She is also having musculoskeletal pain upon coughing affecting her lower abdomen.      Blood workup done on May 30, 2025 showed a hemoglobin of 17.7 and a hematocrit of 53.8.  The rest was normal.  Her CMP showed a an increase in her total bilirubin at 1.1.  TSH free T4 HIV hepatitis-C antibody CRP came back normal.    Father had colon cancer and  from it at the age of 33 yo.  Patient had ?uterine and ovarian cancer in her early thirties.  Paternal grandmother had skin cancer.  Paternal aunt had breast cancer       Oncology History    No history exists.        Past Medical History:   Diagnosis Date    Headache     Numbness and tingling of right arm 2022    Pneumothorax     Uterus cancer        Past Surgical History:   Procedure Laterality Date    CHEST TUBE  INSERTION Left     HYSTERECTOMY         Family History   Problem Relation Name Age of Onset    Thyroid disease Mother      Cancer Father          Colon    Heart disease Father      Heart disease Paternal Grandmother      Skin cancer Paternal Grandmother      Heart disease Paternal Grandfather      Heart attack Paternal Uncle      Breast cancer Maternal Aunt      Ovarian cancer Neg Hx         Social History     Socioeconomic History    Marital status:    Tobacco Use    Smoking status: Some Days     Current packs/day: 0.50     Average packs/day: 1 pack/day for 35.5 years (35.4 ttl pk-yrs)     Types: Cigarettes     Start date: 1990    Smokeless tobacco: Never    Tobacco comments:     Decreased smoking   Substance and Sexual Activity    Alcohol use: Yes     Comment: social     Drug use: Never    Sexual activity: Yes     Partners: Male     Birth control/protection: See Surgical Hx     Social Drivers of Health     Financial Resource Strain: Low Risk  (6/16/2025)    Overall Financial Resource Strain (CARDIA)     Difficulty of Paying Living Expenses: Not hard at all   Food Insecurity: No Food Insecurity (6/16/2025)    Hunger Vital Sign     Worried About Running Out of Food in the Last Year: Never true     Ran Out of Food in the Last Year: Never true   Transportation Needs: No Transportation Needs (6/16/2025)    PRAPARE - Transportation     Lack of Transportation (Medical): No     Lack of Transportation (Non-Medical): No   Physical Activity: Unknown (6/16/2025)    Exercise Vital Sign     Days of Exercise per Week: 0 days   Stress: No Stress Concern Present (6/16/2025)    [employer] - Occupational Stress Questionnaire     Feeling of Stress : Only a little   Housing Stability: Low Risk  (6/16/2025)    Housing Stability Vital Sign     Unable to Pay for Housing in the Last Year: No     Number of Times Moved in the Last Year: 0     Homeless in the Last Year: No       Review of patient's  "allergies indicates:   Allergen Reactions    Bupropion Nausea And Vomiting       Review of Systems   Constitutional:  Positive for appetite change and unexpected weight change.   HENT:  Negative for mouth sores.    Eyes:  Negative for visual disturbance.   Respiratory:  Positive for cough and shortness of breath.    Cardiovascular:  Negative for chest pain.   Gastrointestinal:  Positive for anal bleeding. Negative for abdominal pain and diarrhea.   Genitourinary:  Negative for frequency.   Musculoskeletal:  Negative for back pain.   Integumentary:  Negative for rash.   Neurological:  Negative for headaches.   Hematological:  Negative for adenopathy.   Psychiatric/Behavioral:  The patient is not nervous/anxious.             Objective:     Vitals:    06/16/25 0904   BP: 112/74   BP Location: Left arm   Patient Position: Sitting   Pulse: 81   Temp: 97.8 °F (36.6 °C)   TempSrc: Oral   Weight: 42.8 kg (94 lb 5.7 oz)   Height: 5' 5" (1.651 m)        Physical Exam  Vitals reviewed.   Constitutional:       Appearance: Normal appearance.   HENT:      Head: Normocephalic and atraumatic.   Eyes:      General: No scleral icterus.     Pupils: Pupils are equal, round, and reactive to light.   Cardiovascular:      Rate and Rhythm: Normal rate and regular rhythm.      Pulses: Normal pulses.      Heart sounds: Normal heart sounds.   Pulmonary:      Effort: Pulmonary effort is normal.      Breath sounds: Normal breath sounds.   Abdominal:      General: Bowel sounds are normal. There is no distension.   Musculoskeletal:         General: No swelling.   Lymphadenopathy:      Cervical: No cervical adenopathy.   Skin:     General: Skin is warm.      Findings: No rash.   Neurological:      General: No focal deficit present.      Mental Status: She is alert and oriented to person, place, and time.      Cranial Nerves: No cranial nerve deficit.      Motor: No weakness.   Psychiatric:         Mood and Affect: Mood normal.         Behavior: " Behavior normal.                Current Outpatient Medications on File Prior to Visit   Medication Sig    albuterol-budesonide (AIRSUPRA) 90-80 mcg/actuation Inhale 2 puffs into the lungs 3 (three) times daily.    albuterol-ipratropium (DUO-NEB) 2.5 mg-0.5 mg/3 mL nebulizer solution Take 3 mLs by nebulization every 4 (four) hours as needed for Shortness of Breath. Rescue    budesonide-glycopyr-formoterol (BREZTRI AEROSPHERE) 160-9-4.8 mcg/actuation HFAA Inhale 2 puffs into the lungs 2 (two) times daily. (Patient not taking: Reported on 6/16/2025)    meloxicam (MOBIC) 15 MG tablet TAKE 1 TABLET(15 MG) BY MOUTH EVERY DAY    mupirocin (BACTROBAN) 2 % ointment Apply topically 3 (three) times daily.    sulfamethoxazole-trimethoprim 800-160mg (BACTRIM DS) 800-160 mg Tab Take 1 tablet by mouth 2 (two) times daily.    sumatriptan (IMITREX) 50 MG tablet TAKE 1 TABLET BY MOUTH EVERY 2 HOURS AS NEEDED FOR MIGRAINE. NO MORE THAN 2 TABLETS IN A DAY.     No current facility-administered medications on file prior to visit.       CBC:  Lab Results   Component Value Date    WBC 6.67 05/30/2025    HGB 17.7 (H) 05/30/2025    HCT 53.8 (H) 05/30/2025    MCV 92 05/30/2025     05/30/2025         CMP:  Sodium   Date Value Ref Range Status   05/30/2025 142 136 - 145 mmol/L Final     Potassium   Date Value Ref Range Status   05/30/2025 4.3 3.5 - 5.1 mmol/L Final     Comment:     Anion Gap reference range revised on 4/28/2023     Chloride   Date Value Ref Range Status   05/30/2025 103 95 - 110 mmol/L Final     CO2   Date Value Ref Range Status   05/30/2025 31 (H) 23 - 29 mmol/L Final     Glucose   Date Value Ref Range Status   05/30/2025 87 70 - 110 mg/dL Final     Comment:     The ADA recommends the following guidelines for fasting glucose:    Normal:       less than 100 mg/dL    Prediabetes:  100 mg/dL to 125 mg/dL    Diabetes:     126 mg/dL or higher       BUN   Date Value Ref Range Status   05/30/2025 18 6 - 20 mg/dL Final      Creatinine   Date Value Ref Range Status   05/30/2025 0.71 0.50 - 1.40 mg/dL Final     Calcium   Date Value Ref Range Status   05/30/2025 9.7 8.7 - 10.5 mg/dL Final     Total Protein   Date Value Ref Range Status   05/30/2025 7.0 6.0 - 8.4 g/dL Final     Albumin   Date Value Ref Range Status   05/30/2025 4.6 3.5 - 5.2 g/dL Final     Total Bilirubin   Date Value Ref Range Status   05/30/2025 1.1 (H) 0.2 - 1.0 mg/dL Final     Alkaline Phosphatase   Date Value Ref Range Status   05/30/2025 122 40 - 150 U/L Final     AST   Date Value Ref Range Status   05/30/2025 21 10 - 40 U/L Final     ALT   Date Value Ref Range Status   05/30/2025 17 10 - 44 U/L Final     Anion Gap   Date Value Ref Range Status   05/30/2025 8 8 - 16 mmol/L Final     Comment:     Anion Gap reference range revised on 4/28/2023     eGFR if    Date Value Ref Range Status   08/24/2021 >60 >60 mL/min/1.73 m^2 Final     eGFR if non    Date Value Ref Range Status   08/24/2021 >60 >60 mL/min/1.73 m^2 Final     Comment:     Calculation used to obtain the estimated glomerular filtration  rate (eGFR) is the CKD-EPI equation.          X-Ray Chest PA And Lateral  Narrative: EXAMINATION:  XR CHEST PA AND LATERAL    CLINICAL HISTORY:  Shortness of breath.    COMPARISON:  Chest x-ray 12/22/2023    FINDINGS:  Frontal and lateral views of the chest was obtained.  The cardiac silhouette is within normal limits for size. There is partially calcified.  Pulmonary hyperinflation with flattening of the diaphragms suggestive of COPD is seen.  Linear atelectasis or scarring in the lateral right upper lung is noted.  There is interval improved aeration in the mid to lower right lung.  No evidence of lobar type consolidation, visible pneumothorax, or pleural effusion is seen.  No acute displaced fracture is visualized.  Impression: 1. No evidence of lobar type consolidation or acute cardiac decompensation is appreciated.  There is interval  improved aeration in the mid to lower right lung.    Electronically signed by: Yonathan Rodrigez MD  Date:    12/26/2024  Time:    22:00       ECOG SCORE                  Assessment/Plan:       Unintentional weight loss  I reviewed independently the patient medical record including her laboratory radiologic finding.    The differential diagnosis of an unintentional weight loss was discussed at length.  An underlying malignancy will require to be further evaluated in view of the worsening of her shortness of breath and cough.    She will be scheduled to undergo a CTA chest with IV contrast.    She also will be scheduled to undergo a repeat colonoscopy in view of her family history of colon cancer.        Polycythemia:  I reviewed independently the patient medical record including his laboratory and radiologic findings.    The differential diagnosis of polycythemia was reviewed at length and this include a false elevation, iron overload , hypercapnia versus medication side effects  as well possibly a primary myeloproliferative disorder versus a paraneoplastic manifestation of solid tumor such as liver or kidney cancer.    The patient will have blood workup drawn for CBC CMP iron studies erythropoietin and carboxyhemoglobin.   She will be scheduled to undergo an ultrasound of the abdomen.  She will be seen back again in 2 weeks to discuss the results of the workup in the next step in his management.       Tobacco abuse.    The patient was highly advised to quit smoking.      Family history of cancer.    The patient will be referred to be evaluated by our genetic counselor.      60 minutes of total time spent on the encounter, which includes face to face time and non-face to face time preparing to see the patient (eg, review of tests), obtaining and/or reviewing separately obtained history, documenting clinical information in the electronic or other health record, independently interpreting results (not separately reported)  and communicating results to the patient/family/caregiver, or care coordination (not separately reported).            Med Onc Chart Routing      Follow up with physician . Virtual 2-3 weeks to discuss labs and CT chest with IV contrast STAT and abdominal US. Referral to genetics.( virtual). Case request for endoscopy   Follow up with [facility]    Infusion scheduling note    Injection scheduling note    Labs CBC, CMP, ferritin and iron and TIBC   Scheduling:  Preferred lab:  Lab interval:  erythopetin, carboxyhemoglobin   Imaging    Pharmacy appointment    Other referrals                 Plan was discussed with the patient at length, and she verbalized understanding. Pat was given an opportunity to ask questions that were answered to her satisfaction, and she was advised to call in the interval if any problems or questions arise.  Signed:  Gifty Arango MD   Hematology and Oncology  [facility] - HEMATOLOGY ONCOLOGY  [facility], [place]

## 2025-06-16 ENCOUNTER — OFFICE VISIT (OUTPATIENT)
Dept: HEMATOLOGY/ONCOLOGY | Facility: CLINIC | Age: 55
End: 2025-06-16
Payer: COMMERCIAL

## 2025-06-16 ENCOUNTER — LAB VISIT (OUTPATIENT)
Dept: LAB | Facility: HOSPITAL | Age: 55
End: 2025-06-16
Attending: INTERNAL MEDICINE
Payer: COMMERCIAL

## 2025-06-16 VITALS
TEMPERATURE: 98 F | WEIGHT: 94.38 LBS | BODY MASS INDEX: 15.72 KG/M2 | HEART RATE: 81 BPM | DIASTOLIC BLOOD PRESSURE: 74 MMHG | SYSTOLIC BLOOD PRESSURE: 112 MMHG | HEIGHT: 65 IN

## 2025-06-16 DIAGNOSIS — Z80.0 FAMILY HISTORY OF COLON CANCER: ICD-10-CM

## 2025-06-16 DIAGNOSIS — D75.1 POLYCYTHEMIA: ICD-10-CM

## 2025-06-16 DIAGNOSIS — R79.89 ABNORMAL LFTS: ICD-10-CM

## 2025-06-16 DIAGNOSIS — R63.4 UNINTENTIONAL WEIGHT LOSS: ICD-10-CM

## 2025-06-16 DIAGNOSIS — J44.9 COPD WITH HYPOXIA: ICD-10-CM

## 2025-06-16 DIAGNOSIS — D75.1 POLYCYTHEMIA: Primary | ICD-10-CM

## 2025-06-16 DIAGNOSIS — Z72.0 TOBACCO ABUSE: ICD-10-CM

## 2025-06-16 LAB
ABSOLUTE EOSINOPHIL (OHS): 0.14 K/UL
ABSOLUTE MONOCYTE (OHS): 0.44 K/UL (ref 0.3–1)
ABSOLUTE NEUTROPHIL COUNT (OHS): 4.43 K/UL (ref 1.8–7.7)
ALBUMIN SERPL BCP-MCNC: 4.6 G/DL (ref 3.5–5.2)
ALP SERPL-CCNC: 108 UNIT/L (ref 40–150)
ALT SERPL W/O P-5'-P-CCNC: 19 UNIT/L (ref 10–44)
ANION GAP (OHS): 13 MMOL/L (ref 8–16)
AST SERPL-CCNC: 23 UNIT/L (ref 11–45)
BASOPHILS # BLD AUTO: 0.05 K/UL
BASOPHILS NFR BLD AUTO: 0.7 %
BILIRUB SERPL-MCNC: 1.3 MG/DL (ref 0.1–1)
BUN SERPL-MCNC: 8 MG/DL (ref 6–20)
CALCIUM SERPL-MCNC: 10 MG/DL (ref 8.7–10.5)
CHLORIDE SERPL-SCNC: 105 MMOL/L (ref 95–110)
CO2 SERPL-SCNC: 27 MMOL/L (ref 23–29)
CREAT SERPL-MCNC: 0.8 MG/DL (ref 0.5–1.4)
ERYTHROCYTE [DISTWIDTH] IN BLOOD BY AUTOMATED COUNT: 13.2 % (ref 11.5–14.5)
FERRITIN SERPL-MCNC: 79 NG/ML (ref 20–300)
GFR SERPLBLD CREATININE-BSD FMLA CKD-EPI: >60 ML/MIN/1.73/M2
GLUCOSE SERPL-MCNC: 89 MG/DL (ref 70–110)
HCT VFR BLD AUTO: 49.3 % (ref 37–48.5)
HGB BLD-MCNC: 16.7 GM/DL (ref 12–16)
IMM GRANULOCYTES # BLD AUTO: 0.02 K/UL (ref 0–0.04)
IMM GRANULOCYTES NFR BLD AUTO: 0.3 % (ref 0–0.5)
IRON SATN MFR SERPL: 39 % (ref 20–50)
IRON SERPL-MCNC: 164 UG/DL (ref 30–160)
LYMPHOCYTES # BLD AUTO: 2.17 K/UL (ref 1–4.8)
MCH RBC QN AUTO: 31.2 PG (ref 27–31)
MCHC RBC AUTO-ENTMCNC: 33.9 G/DL (ref 32–36)
MCV RBC AUTO: 92 FL (ref 82–98)
NUCLEATED RBC (/100WBC) (OHS): 0 /100 WBC
PLATELET # BLD AUTO: 205 K/UL (ref 150–450)
PMV BLD AUTO: 9.7 FL (ref 9.2–12.9)
POTASSIUM SERPL-SCNC: 4.4 MMOL/L (ref 3.5–5.1)
PROT SERPL-MCNC: 7.8 GM/DL (ref 6–8.4)
RBC # BLD AUTO: 5.35 M/UL (ref 4–5.4)
RELATIVE EOSINOPHIL (OHS): 1.9 %
RELATIVE LYMPHOCYTE (OHS): 29.9 % (ref 18–48)
RELATIVE MONOCYTE (OHS): 6.1 % (ref 4–15)
RELATIVE NEUTROPHIL (OHS): 61.1 % (ref 38–73)
SODIUM SERPL-SCNC: 145 MMOL/L (ref 136–145)
TIBC SERPL-MCNC: 419 UG/DL (ref 250–450)
TRANSFERRIN SERPL-MCNC: 283 MG/DL (ref 200–375)
WBC # BLD AUTO: 7.25 K/UL (ref 3.9–12.7)

## 2025-06-16 PROCEDURE — 84466 ASSAY OF TRANSFERRIN: CPT

## 2025-06-16 PROCEDURE — 36415 COLL VENOUS BLD VENIPUNCTURE: CPT | Mod: PN

## 2025-06-16 PROCEDURE — G2211 COMPLEX E/M VISIT ADD ON: HCPCS | Mod: S$GLB,,, | Performed by: INTERNAL MEDICINE

## 2025-06-16 PROCEDURE — 82728 ASSAY OF FERRITIN: CPT

## 2025-06-16 PROCEDURE — 82435 ASSAY OF BLOOD CHLORIDE: CPT | Mod: PN

## 2025-06-16 PROCEDURE — 85025 COMPLETE CBC W/AUTO DIFF WBC: CPT | Mod: PN

## 2025-06-16 PROCEDURE — 99205 OFFICE O/P NEW HI 60 MIN: CPT | Mod: S$GLB,,, | Performed by: INTERNAL MEDICINE

## 2025-06-16 PROCEDURE — 82668 ASSAY OF ERYTHROPOIETIN: CPT

## 2025-06-16 PROCEDURE — 99999 PR PBB SHADOW E&M-EST. PATIENT-LVL IV: CPT | Mod: PBBFAC,,, | Performed by: INTERNAL MEDICINE

## 2025-06-16 PROCEDURE — 82375 ASSAY CARBOXYHB QUANT: CPT

## 2025-06-17 ENCOUNTER — HOSPITAL ENCOUNTER (OUTPATIENT)
Dept: RADIOLOGY | Facility: HOSPITAL | Age: 55
Discharge: HOME | End: 2025-06-17
Attending: INTERNAL MEDICINE
Payer: COMMERCIAL

## 2025-06-17 DIAGNOSIS — R79.89 ABNORMAL LFTS: ICD-10-CM

## 2025-06-17 PROCEDURE — 76700 US EXAM ABDOM COMPLETE: CPT | Mod: TC,PO

## 2025-06-17 PROCEDURE — 76700 US EXAM ABDOM COMPLETE: CPT | Mod: 26,,, | Performed by: STUDENT IN AN ORGANIZED HEALTH CARE EDUCATION/TRAINING PROGRAM

## 2025-06-19 LAB — W ERYTHROPOIETIN (EPO): 15.8 MIU/ML

## 2025-06-20 PROBLEM — Z85.42 HISTORY OF UTERINE CANCER: Status: ACTIVE | Noted: 2025-06-20

## 2025-06-20 NOTE — PROGRESS NOTES
Cancer Genetics  Hereditary and High-Risk Clinic  Department of Hematology and Oncology  [facility]    Date of Service:  25  Visit Provider:  Mari Miranda MS, [facility]    Patient ID  Name: Pat Freed    : 1970    MRN: [id number]      Referring Provider  Gifty Arango MD  [address] [place] [address]    Televisit Information  The patient location is: [place], [place].    The chief complaint leading to consultation is:  As below.    Visit type: audiovisual.      Face-to-face time with patient:  Approximately 27 minutes.    Approximately 58 minutes in total were spent on the day of this encounter, which includes face-to-face time and non-face-to-face time preparing to see the patient (e.g., review of records and tests), obtaining and/or reviewing separately obtained history, documenting clinical information in the electronic or other health record, independently interpreting results (not separately reported) and communicating results to the patient/family/caregiver, or care coordination (not separately reported).  Each patient to whom he or she provides medical services by telemedicine is:  (1) informed of the relationship between the physician and patient and the respective role of any other health care provider with respect to management of the patient; and (2) notified that he or she may decline to receive medical services by telemedicine and may withdraw from such care at any time.    IMPRESSION      Pat Freed is a pleasant 54 y.o. female patient seen in genetic counseling given her family history of colon cancer and personal history of uterine cancer. Pat Freed was unaccompanied today. Pat meets NCCN Fairchild criteria for genetic testing based on her father having colon cancer at 30yo and her uterine cancer at 35yo. We discussed that colon, uterine, and even certain skin cancers can be seen together, all caused by an underlying hereditary  "predisposition. Pat shared it is possible that her cancer was actually cervical, and not uterine cancer, however it has been many years since her surgery. Regardless, we discussed that she does qualify for genetic testing based on her father's diagnosis of colon cancer at such a young age. Pat expressed that she is making the decision to proceed with testing with her children and grandson in mind. She elected to proceed with the [facility] x with RNA panel. A blood draw is scheduled for 6/26/2025.     FOCUSED PERSONAL HISTORY     Chief Complaint: Genetic Evaluation (Family history of colon cancer; Personal history of uterine cancer)    History of Present Illness (HPI):  Pat Freed ("Pat"), 54 y.o., assigned female sex at birth, is established with the [facility] but new to me.  She was referred by Medical Oncology for hereditary cancer risk assessment given her family history of colon cancer and personal history of uterine cancer.    Cancer History  History of uterine cancer at 35yo, s/p total hysterectomy  She reports she first had painful intercourse and pH imbalance and was followed by OBGYN  She ultimately had an ultrasound which identified cancer  Shared that it is possible that this was cervical and not uterine cancer, however this was in 2007    Masses/tumors/lesions  Skin lesion    Focused Medical History  Previous germline cancer genetic testing:  No  Colonoscopy: Yes  Most recent colonoscopy: 2021  Colon polyp:  Yes   Mammogram: Yes  Most recent mammogram: 2022  Breast MRI:  No  Pancreatitis:  No    Focused Surgical History  Reproductive organs:  s/p hysterectomy and s/p bilateral salpingo-oophorectomy    Tobacco Use  Tobacco Use: High Risk (6/16/2025)    Patient History     Smoking Tobacco Use: Some Days     Smokeless Tobacco Use: Never     Passive Exposure: Not on file     Established with pulmonology for COPD, she does get chest X-rays for " imaging    FAMILY HISTORY     Cancer Pedigree     Daughter with a skin cancer on her head    Maternal:  No known history of cancer    Paternal:  Father with colon cancer at 32yo  Aunt with breast cancer  Grandmother with skin cancer  Grandfather with unknown kind of cancer    A family history of birth defects, intellectual disability, SIDS, sudden early death, multiple miscarriages and consanguinity were denied. Please refer to above pedigree for further details. A larger copy has been scanned in the Media tab.     DISCUSSION     HERNANDEZ-INDICATION    Approximately 10% of colorectal cancers are hereditary. Things that make us suspicious of a hereditary cause of cancer include the type of cancers seen in the family, number of people with cancer, ages of people with cancer, and certain cancer pathologies.The majority of hereditary colorectal cancer is caused by mutations in MLH1, MSH2, MSH6, PMS2 or EPCAM, which cause Hernandez syndrome/Hereditary Nonpolyposis Colorectal Cancer (HNPCC). Additionally, there are a number of other highly and moderately penetrant genes that are associated with increased risks of colorectal cancer, such as AXIN2, CHEK2, GALNT12, PTEN, STK11, and TP53. Pat meets NCCN Hernandez criteria for genetic testing based on her father having colon cancer at 32yo and her uterine cancer at 37yo. Therefore, she was offered phenotype-driven and broad panel testing. Pat opted for the xG with RNA panel through 40 of the following  genes associated with hereditary cancers:    APC, ABBIE, AXIN2, BAP1, BARD1, BMPR1A, BRCA1, BRCA2, BRIP1, CDH1, CDK4, CDKN2A, CHEK2, EPCAM, FH, FLCN, GREM1, HOXB13, MBD4, MET, MLH1, MSH2, MSH3, MSH6, MUTYH, NF1, NTHL1, PALB2, PMS2, POLD1, POLE, PTEN, RAD51C, RAD51D, RPS20, SMAD4, STK11, TP53, TSC1, TSC2, VHL     We reviewed that mutations in the highly penetrant genes put an individual at a significantly increased risk of colorectal and other cancers.  There are established screening  and surgery guidelines for these syndromes. Mutations in the moderately penetrant genes increase the risk of colorectal and other cancers, but less is understood regarding their role in cancer risk. There may not be standard screening or management guidelines for individuals who have mutations in these genes.     Furthermore, we discussed the psychosocial implications of a positive result, including anxiety, fear and guilt if a mutation is passed on to a child. Pat expressed wanting information for both herself, her children, and grandson.    Possible Results:    Positive (pathogenic or likely pathogenic variant): A genetic variant was found that is suspected or known to impact the function of the gene. The impact of a positive result on an individual's risk of cancer varies based on the gene, specific variant, individual's sex assigned at birth, personal cancer history, other health history (such as surgical history), and family history. A positive result can impact screening and risk management recommendations. However, there are not always available guidelines for management based on a specific gene variant. Family history and personal risk factors should always be considered. Sometimes, a positive result can also have treatment or reproductive implications.   Negative: No clinically significant variants were reported in the tested genes. A negative result does not indicate that an individual cannot develop cancer or even that the individual is at average risk. An individual may still be at an increased risk for cancer based on personal risk factors or family history. Additionally, there could be a hereditary cancer predisposition that was not included in a chosen panel or is not detected with current technology.   Variant of Uncertain Significance (VUS): A variant was found. However, the lab does not have enough information to determine if the variant is benign (harmless) or pathogenic (impacts the function  of the gene). The laboratory may update (reclassify) the variant over time as more information becomes available. When reclassified, most variants of uncertain significance are reclassified to benign/likely benign. Typically, it is not recommended to  based on the presence of a VUS. The chance of finding a VUS varies based on the test performed. Generally, the chance of finding a VUS increases with the number of genes tested and decreases with the amount of testing of that gene (genes that are tested more frequently or for a longer period of time have a lower VUS rate).    Genetic Mutation Inheritance:  When an individual has a gene mutation, their first-degree relatives (parents, children, and siblings) each have a 50% chance of carrying the same mutation. Other, more distant blood relatives can also be at risk of carrying the same mutation. At-risk relatives of an individual with a mutation should consider genetic testing to help determine their risk for cancer.     Genetic Discrimination: The Genetic Information Nondiscrimination Act of 2008 (TRISTAN) is a [place] federal law that provides some protections against the use of an individual's genetic information by their health insurer and by their employer. Title I of TRISTAN prohibits most health insurers (except for insurance obtained through a job with the  or the Federal Employees Health Benefits Plan) from utilizing an individual's genetic information to make decisions regarding insurance eligibility or premium charges. Title II of TRISTAN prohibits covered entities from requesting or requiring the genetic information of employees and applicants and from using said information to make employment decisions. This does not apply to employers with fewer than 15 employees or to the .  TRISTAN also does not protect individuals from genetic discrimination by any other type of policy or entity, including but not limited to life insurance, disability  "insurance, long-term care insurance,  benefits, and  Health Services benefits.    There is also a possibility for the patient to incur out-of-pocket costs related to this testing. Pat appeared to have a good understanding of the information as she asked appropriate questions.  Pat received comprehensive counseling regarding panel testing and has elected to proceed with this testing. A sample was scheduled to be submitted on 6/26/2025 to [facility].  Pat's results should be available in approximately 3 weeks.  In the meantime, she is welcome to contact me if she has any questions, concerns, or updates to her family history.       ASSESSMENT / PLAN      Pat Freed ("Pat"), 54 y.o., presented today for hereditary cancer risk assessment and genetic counseling given family history of colon cancer and personal history of uterine cancer:  Pat meets NCCN Fairchild criteria for genetic testing based on her father having colon cancer at 32yo and her uterine cancer at 35yo.   We discussed that colon, uterine, and even certain skin cancers can be seen together, all caused by an underlying hereditary predisposition.   Proceeding with the [facility] xG with RNA panel  A blood sample will be collected 6/26/2025      ICD-10-CM ICD-9-CM   1. Family history of colon cancer  Z80.0 V16.0   2. History of uterine cancer  Z85.42 V10.42   3. Encounter for nonprocreative genetic counseling  Z71.83 V26.33   4. Family history of skin cancer  Z80.8 V16.8   5. Family history of breast cancer  Z80.3 V16.3     1. Family history of colon cancer  - Ambulatory referral/consult to Genetics  - [facility] - Hereditary Cancer with RNA; Future    2. History of uterine cancer  - [facility] - Hereditary Cancer with [facility]; Future    3. Encounter for nonprocreative genetic counseling  [facility]; Future    4. Family history of skin cancer  - [facility] - Hereditary Cancer with RNA; Future    5. Family history of " breast cancer  - [facility] - Hereditary Cancer with RNA; Future       Genetic Test Information  Testing lab: [facility]   Test panel: xG with RNA    ICD-10 code(s): Z80.0, Z85.42, Z71.83, Z80.8, Z80.3   Verbal informed consent: Obtained   Written informed consent: To Be Obtained   Specimen type: Blood  (Patient denies blood disorders that would necessitate a skin fibroblast specimen)   Specimen collection by: [facility]   Specimen collection date: 06/26/2025   Results expected by: Approximately 2-3 weeks after the genetic testing lab receives the specimen   Results disclosure plan: Post-test visit if positive or complex result; otherwise, results will be communicated through phone call       Follow-up:  No follow-ups on file.    Questions were encouraged and answered to the patient's satisfaction, and she verbalized understanding of the information and agreement with the plan.         Approximately 27 minutes were spent face-to-face with the patient.  Approximately 58 minutes in total were spent on this encounter, which includes face-to-face time and non-face-to-face time preparing to see the patient (e.g., review of tests), obtaining and/or reviewing separately obtained history, documenting clinical information in the electronic or other health record, independently interpreting results (not separately reported) and communicating results to the patient/family/caregiver, or care coordination (not separately reported).     This assessment is based on the history and reports provided, as well as the current scientific knowledge regarding cancer genetics.         Mari Miranda MS, [place]  Genetic Counselor, Hereditary and High-Risk Clinic  Department of Hematology and Oncology  [facility]        CC:  Dr. Gifty Arango

## 2025-06-24 ENCOUNTER — PATIENT MESSAGE (OUTPATIENT)
Dept: HEMATOLOGY/ONCOLOGY | Facility: CLINIC | Age: 55
End: 2025-06-24
Payer: COMMERCIAL

## 2025-06-25 ENCOUNTER — PATIENT MESSAGE (OUTPATIENT)
Dept: HEMATOLOGY/ONCOLOGY | Facility: CLINIC | Age: 55
End: 2025-06-25

## 2025-06-25 ENCOUNTER — OFFICE VISIT (OUTPATIENT)
Dept: HEMATOLOGY/ONCOLOGY | Facility: CLINIC | Age: 55
End: 2025-06-25
Payer: COMMERCIAL

## 2025-06-25 DIAGNOSIS — Z85.42 HISTORY OF UTERINE CANCER: ICD-10-CM

## 2025-06-25 DIAGNOSIS — Z80.3 FAMILY HISTORY OF BREAST CANCER: ICD-10-CM

## 2025-06-25 DIAGNOSIS — Z71.83 ENCOUNTER FOR NONPROCREATIVE GENETIC COUNSELING: ICD-10-CM

## 2025-06-25 DIAGNOSIS — Z80.0 FAMILY HISTORY OF COLON CANCER: Primary | ICD-10-CM

## 2025-06-25 DIAGNOSIS — Z80.8 FAMILY HISTORY OF SKIN CANCER: ICD-10-CM

## 2025-06-25 PROCEDURE — 96041 GENETIC COUNSELING SVC EA 30: CPT | Mod: 95,,, | Performed by: BEHAVIOR TECHNICIAN

## 2025-06-25 PROCEDURE — 99499 UNLISTED E&M SERVICE: CPT | Mod: 95,,, | Performed by: BEHAVIOR TECHNICIAN

## 2025-06-26 ENCOUNTER — TELEPHONE (OUTPATIENT)
Dept: GASTROENTEROLOGY | Facility: CLINIC | Age: 55
End: 2025-06-26
Payer: COMMERCIAL

## 2025-06-26 ENCOUNTER — LAB VISIT (OUTPATIENT)
Dept: LAB | Facility: HOSPITAL | Age: 55
End: 2025-06-26
Attending: INTERNAL MEDICINE
Payer: COMMERCIAL

## 2025-06-26 DIAGNOSIS — Z80.8 FAMILY HISTORY OF SKIN CANCER: ICD-10-CM

## 2025-06-26 DIAGNOSIS — Z71.83 ENCOUNTER FOR NONPROCREATIVE GENETIC COUNSELING: ICD-10-CM

## 2025-06-26 DIAGNOSIS — Z85.42 HISTORY OF UTERINE CANCER: ICD-10-CM

## 2025-06-26 DIAGNOSIS — Z80.3 FAMILY HISTORY OF BREAST CANCER: ICD-10-CM

## 2025-06-26 DIAGNOSIS — Z80.0 FAMILY HISTORY OF COLON CANCER: ICD-10-CM

## 2025-06-26 PROCEDURE — 36415 COLL VENOUS BLD VENIPUNCTURE: CPT | Mod: PN

## 2025-06-26 NOTE — TELEPHONE ENCOUNTER
Attempted to reach pt, left VM stated pt needs office visit before scheduling cscope.   Call back number provided.

## 2025-06-26 NOTE — TELEPHONE ENCOUNTER
Copied from CRM #[id number]. Topic: General Inquiry - Return Call  >> Jun 26, 2025 12:02 PM Sarah Beth wrote:  Type:  Patient Returning Call    Who Called: pt    Who Left Message for Patient: enoc     Does the patient know what this is regarding?: appt?     Would the patient rather a call back or a response via [facility]?  Call back    Best Call Back Number: [phone]    Additional Information:  wants to know why she needs appt before colonoscopy    Please call to advise    Thanks

## 2025-06-26 NOTE — TELEPHONE ENCOUNTER
"Returned pt call, informed her colon Dx was for - weight loss, Family Hx of colon cancer    Pt unsure as to why she need to be seen in clinic, she has already seen 2 other providers for unintentional weight loss.     Explained to pt Dx attached to her colon case request is diagnostic... educated pt on difference between diagnostic and screening colons    Offered pt appt with NP, pt refused and stated "If I'm going to pay money to see a Dr then I want to see a Dr. Im tired of seeing NP's"    Pt agreed to appt date/time/location   All questions/concerns addressed at this time.  "

## 2025-07-09 ENCOUNTER — TELEPHONE (OUTPATIENT)
Dept: HEMATOLOGY/ONCOLOGY | Facility: CLINIC | Age: 55
End: 2025-07-09
Payer: COMMERCIAL

## 2025-07-09 NOTE — PROGRESS NOTES
The patient location is: [place]  FOLLOW UP TELEMEDICINE VISIT    Subjective:      Patient ID: Pat Freed is a 54 y.o. female.  MRN: [id number]  : 1970    An audio and visual care visit was performed with the patient because of the COVID-19 pandemic recommendations for social distancing.    TELEMEDICINE  The patient location is: [place]  The chief complaint leading to consultation is: unintentional weight loss and and polycythemia     Visit type: Virtual visit with synchronous audio and video    Total time spent with patient: 10 minutes  35 minutes of total time spent on the encounter, which includes face to face time and non-face to face time preparing to see the patient (eg, review of tests), obtaining and/or reviewing separately obtained history, documenting clinical information in the electronic or other health record, independently interpreting results (not separately reported) and communicating results to the patient/family/caregiver, or care coordination (not separately reported).    Each patient to whom he or she provides medical services by telemedicine is:  (1) informed of the relationship between the physician and patient and the respective role of any other health care provider with respect to management of the patient; and (2) notified that he or she may decline to receive medical services by telemedicine and may withdraw from such care at any time.    History of Present Illness:   HPI  Pat Freed is a 54 y.o. female presents for evaluation of unintentional weight loss and and polycythemia   Patient is reporting to this visit by herself.    The patient denies CP, cough, SOB, abdominal pain, nausea, vomiting, constipation.  The patient denies fever, chills, night sweats, weight loss, new lumps or bumps, easy bruising or bleeding.    Oncology History:  Oncology History    No history exists.      Past medical, surgical, family, and social history were reviewed today and there are no  changes of note unless mentioned in HPI.   MEDS and ALLERGIES were reviewed with patient and meds reconciled.     History:  Past Medical History:   Diagnosis Date    Headache     Numbness and tingling of right arm 05/20/2022    Pneumothorax     Uterus cancer 2007      Past Surgical History:   Procedure Laterality Date    CHEST TUBE INSERTION Left     HYSTERECTOMY       Family History   Problem Relation Name Age of Onset    Thyroid disease Mother      Colon cancer Father Father 31    Heart disease Father Father     Cancer Father Father     Heart disease Paternal Grandmother      Skin cancer Paternal Grandmother      Cancer Paternal Grandfather          type unknown    Heart disease Paternal Grandfather      Heart attack Paternal Uncle Don     Skin cancer Daughter Harsh         on head    Breast cancer Paternal Aunt Jigna Nova     Ovarian cancer Neg Hx        Social History     Tobacco Use    Smoking status: Some Days     Current packs/day: 0.50     Average packs/day: 1 pack/day for 35.5 years (35.4 ttl pk-yrs)     Types: Cigarettes     Start date: 1990    Smokeless tobacco: Never    Tobacco comments:     Decreased smoking   Substance and Sexual Activity    Alcohol use: Yes     Alcohol/week: 4.0 standard drinks of alcohol     Types: 4 Cans of beer per week     Comment: Every couple months for 1week    Drug use: Never    Sexual activity: Yes     Partners: Male     Birth control/protection: See Surgical Hx, None        ROS:  Answers submitted by the patient for this visit:  Review of Systems Questionnaire (Submitted on 7/10/2025)  appetite change : No  unexpected weight change: No  mouth sores: No  visual disturbance: No  cough: No  shortness of breath: No  chest pain: No  abdominal pain: No  diarrhea: No  frequency: No  back pain: No  rash: No  headaches: No  adenopathy: No  nervous/ anxious: No      Objective:   There were no vitals filed for this visit.  Wt Readings from Last 10 Encounters:   07/08/25 42.7 kg (94 lb  3.2 oz)   06/16/25 42.8 kg (94 lb 5.7 oz)   05/30/25 44.5 kg (98 lb)   05/30/25 41.9 kg (92 lb 6.4 oz)   12/14/24 51 kg (112 lb 7 oz)   09/09/24 51.7 kg (114 lb)   08/15/24 48.7 kg (107 lb 4.8 oz)   05/30/24 50.8 kg (112 lb)   01/02/24 51.8 kg (114 lb 4.8 oz)   12/23/23 50.8 kg (111 lb 15.9 oz)       Physical Examination:   Constitutional: she is alert, pleasant, and does not appear to be in any physical distress   HENT: Mouth/Throat:  Tongue is midline without evidence of glossitis  Eyes: No obvious jaundice or conjunctivitis.  EOM are normal.   Pulmonary/Chest: No stridor noted. No excess chest muscle movement.  Neurological: she is alert and oriented to person, place, and time. No cranial nerve deficit.  Skin:  No rash noted. No erythema.   Psychiatric: she has a normal mood and affect. Speech and memory normal.     Diagnostic Tests:  Significant Imaging:  I have reviewed and interpreted all pertinent imaging results/findings.    Laboratory Data:  Lab Results   Component Value Date    WBC 7.25 06/16/2025    HGB 16.7 (H) 06/16/2025    HCT 49.3 (H) 06/16/2025     06/16/2025    CHOL 251 (H) 09/07/2022    TRIG 148 09/07/2022    HDL 60 09/07/2022    ALT 19 06/16/2025    AST 23 06/16/2025     06/16/2025    K 4.4 06/16/2025     06/16/2025    CREATININE 0.8 06/16/2025    BUN 8 06/16/2025    CO2 27 06/16/2025    TSH 1.270 05/30/2025    INR 0.9 12/11/2022        Labs:   Lab Results   Component Value Date    WBC 7.25 06/16/2025    RBC 5.35 06/16/2025    HGB 16.7 (H) 06/16/2025    HCT 49.3 (H) 06/16/2025    MCV 92 06/16/2025     06/16/2025    GLU 89 06/16/2025     06/16/2025    K 4.4 06/16/2025    BUN 8 06/16/2025    CREATININE 0.8 06/16/2025    AST 23 06/16/2025    ALT 19 06/16/2025    BILITOT 1.3 (H) 06/16/2025         Assessment/Plan:     ECOG SCORE    1 - Restricted in strenuous activity-ambulatory and able to carry out work of a light nature       Unintentional weight loss  I reviewed  independently the patient medical record including her laboratory radiologic finding.    The differential diagnosis of an unintentional weight loss was discussed at length.  An underlying malignancy will require to be further evaluated in view of the worsening of her shortness of breath and cough.    6/16/2025 CT chest with IV contrast showed emphtsema    She also will be scheduled to undergo a repeat colonoscopy in view of her family history of colon cancer.           Polycythemia:  I reviewed independently the patient medical record including his laboratory and radiologic findings.    The differential diagnosis of polycythemia was reviewed at length and this include a false elevation, iron overload , hypercapnia versus medication side effects  as well possibly a primary myeloproliferative disorder versus a paraneoplastic manifestation of solid tumor such as liver or kidney cancer.    -labs on 6/16/2025 showed a Hgb 16.7 g/dl  TB 1.3 normal erythropoietin Ferritin 79 normal  iron studies erythropoietin. Carboxyhemoglobin was elevated at 5%.   Ultrasound of the abdomen on 6/17/2025 came back normal.  The patient was very well at her polycythemia is driven by her tobacco abuse.    She was advised to discuss with her pulmonologist different modalities in helping her to quit     Tobacco abuse.    The patient was highly advised to quit smoking.       Family history of cancer.    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 to discus the gentic testing   Follow up with [facility]    Infusion scheduling note    Injection scheduling note    Labs    Imaging    Pharmacy appointment    Other referrals                 Plan was discussed with the patient at length, and  she verbalized understanding. Pat was given an opportunity to ask questions that were answered to her satisfaction, and she was advised to call in the interval if any problems or questions arise.  Discussed COVID-19 and social distancing in great detail, avoid all non-essential visits out of the home if possible and avoid sick contacts.     Electronically signed by Gifty Arango MD

## 2025-07-09 NOTE — TELEPHONE ENCOUNTER
DEE with callback number regarding logging on for her 8:20 am virtual appointment with Dr Arango this morning.

## 2025-07-10 ENCOUNTER — OFFICE VISIT (OUTPATIENT)
Dept: HEMATOLOGY/ONCOLOGY | Facility: CLINIC | Age: 55
End: 2025-07-10
Payer: COMMERCIAL

## 2025-07-10 DIAGNOSIS — R63.4 UNINTENTIONAL WEIGHT LOSS: ICD-10-CM

## 2025-07-10 DIAGNOSIS — Z15.01 MSH2 GENE MUTATION POSITIVE: ICD-10-CM

## 2025-07-10 DIAGNOSIS — Z80.0 FAMILY HISTORY OF COLON CANCER: ICD-10-CM

## 2025-07-10 DIAGNOSIS — D75.1 POLYCYTHEMIA: Primary | ICD-10-CM

## 2025-07-10 DIAGNOSIS — Z85.42 HISTORY OF UTERINE CANCER: ICD-10-CM

## 2025-07-10 DIAGNOSIS — Z72.0 TOBACCO ABUSE: ICD-10-CM

## 2025-07-10 DIAGNOSIS — R79.89 ABNORMAL LFTS: ICD-10-CM

## 2025-07-10 DIAGNOSIS — Z15.09 MSH2 GENE MUTATION POSITIVE: ICD-10-CM

## 2025-07-10 PROCEDURE — G2211 COMPLEX E/M VISIT ADD ON: HCPCS | Mod: 95,,, | Performed by: INTERNAL MEDICINE

## 2025-07-10 PROCEDURE — 98006 SYNCH AUDIO-VIDEO EST MOD 30: CPT | Mod: 95,,, | Performed by: INTERNAL MEDICINE

## 2025-07-10 NOTE — TELEPHONE ENCOUNTER
Reviewed genetic testing results with patient. We discussed that I would like to have a follow-up appointment to go over her future screening plan, as well as talk about what this means for her family.